# Patient Record
Sex: FEMALE | Race: WHITE | NOT HISPANIC OR LATINO
[De-identification: names, ages, dates, MRNs, and addresses within clinical notes are randomized per-mention and may not be internally consistent; named-entity substitution may affect disease eponyms.]

---

## 2018-12-02 ENCOUNTER — TRANSCRIPTION ENCOUNTER (OUTPATIENT)
Age: 48
End: 2018-12-02

## 2021-09-23 ENCOUNTER — INPATIENT (INPATIENT)
Facility: HOSPITAL | Age: 51
LOS: 4 days | Discharge: HOME | End: 2021-09-28
Attending: STUDENT IN AN ORGANIZED HEALTH CARE EDUCATION/TRAINING PROGRAM | Admitting: STUDENT IN AN ORGANIZED HEALTH CARE EDUCATION/TRAINING PROGRAM
Payer: COMMERCIAL

## 2021-09-23 VITALS
HEART RATE: 101 BPM | RESPIRATION RATE: 18 BRPM | DIASTOLIC BLOOD PRESSURE: 52 MMHG | SYSTOLIC BLOOD PRESSURE: 107 MMHG | TEMPERATURE: 98 F | OXYGEN SATURATION: 99 %

## 2021-09-23 LAB
ALBUMIN SERPL ELPH-MCNC: 3.4 G/DL — LOW (ref 3.5–5.2)
ALP SERPL-CCNC: 418 U/L — HIGH (ref 30–115)
ALT FLD-CCNC: 127 U/L — HIGH (ref 0–41)
ANION GAP SERPL CALC-SCNC: 12 MMOL/L — SIGNIFICANT CHANGE UP (ref 7–14)
ANISOCYTOSIS BLD QL: SLIGHT — SIGNIFICANT CHANGE UP
APAP SERPL-MCNC: 18 UG/ML — SIGNIFICANT CHANGE UP (ref 10–30)
APPEARANCE UR: ABNORMAL
APTT BLD: 39 SEC — SIGNIFICANT CHANGE UP (ref 27–39.2)
AST SERPL-CCNC: 139 U/L — HIGH (ref 0–41)
BACTERIA # UR AUTO: ABNORMAL
BASOPHILS # BLD AUTO: 0 K/UL — SIGNIFICANT CHANGE UP (ref 0–0.2)
BASOPHILS NFR BLD AUTO: 0 % — SIGNIFICANT CHANGE UP (ref 0–1)
BILIRUB DIRECT SERPL-MCNC: 0.6 MG/DL — HIGH (ref 0–0.2)
BILIRUB INDIRECT FLD-MCNC: 0.5 MG/DL — SIGNIFICANT CHANGE UP (ref 0.2–1.2)
BILIRUB SERPL-MCNC: 1.1 MG/DL — SIGNIFICANT CHANGE UP (ref 0.2–1.2)
BILIRUB UR-MCNC: NEGATIVE — SIGNIFICANT CHANGE UP
BLD GP AB SCN SERPL QL: SIGNIFICANT CHANGE UP
BUN SERPL-MCNC: 12 MG/DL — SIGNIFICANT CHANGE UP (ref 10–20)
CALCIUM SERPL-MCNC: 7.8 MG/DL — LOW (ref 8.5–10.1)
CHLORIDE SERPL-SCNC: 101 MMOL/L — SIGNIFICANT CHANGE UP (ref 98–110)
CO2 SERPL-SCNC: 19 MMOL/L — SIGNIFICANT CHANGE UP (ref 17–32)
COLOR SPEC: ABNORMAL
CREAT SERPL-MCNC: 0.7 MG/DL — SIGNIFICANT CHANGE UP (ref 0.7–1.5)
DIFF PNL FLD: ABNORMAL
EOSINOPHIL # BLD AUTO: 0 K/UL — SIGNIFICANT CHANGE UP (ref 0–0.7)
EOSINOPHIL NFR BLD AUTO: 0 % — SIGNIFICANT CHANGE UP (ref 0–8)
GLUCOSE SERPL-MCNC: 119 MG/DL — HIGH (ref 70–99)
GLUCOSE UR QL: NEGATIVE — SIGNIFICANT CHANGE UP
HCT VFR BLD CALC: 29 % — LOW (ref 37–47)
HGB BLD-MCNC: 9.6 G/DL — LOW (ref 12–16)
HYPOCHROMIA BLD QL: SLIGHT — SIGNIFICANT CHANGE UP
INR BLD: 1.04 RATIO — SIGNIFICANT CHANGE UP (ref 0.65–1.3)
KETONES UR-MCNC: NEGATIVE — SIGNIFICANT CHANGE UP
LACTATE SERPL-SCNC: 1 MMOL/L — SIGNIFICANT CHANGE UP (ref 0.7–2)
LEUKOCYTE ESTERASE UR-ACNC: ABNORMAL
LIDOCAIN IGE QN: 34 U/L — SIGNIFICANT CHANGE UP (ref 7–60)
LYMPHOCYTES # BLD AUTO: 6.13 K/UL — HIGH (ref 1.2–3.4)
LYMPHOCYTES # BLD AUTO: 76.3 % — HIGH (ref 20.5–51.1)
MANUAL SMEAR VERIFICATION: SIGNIFICANT CHANGE UP
MCHC RBC-ENTMCNC: 28.6 PG — SIGNIFICANT CHANGE UP (ref 27–31)
MCHC RBC-ENTMCNC: 33.1 G/DL — SIGNIFICANT CHANGE UP (ref 32–37)
MCV RBC AUTO: 86.3 FL — SIGNIFICANT CHANGE UP (ref 81–99)
MONOCYTES # BLD AUTO: 0.14 K/UL — SIGNIFICANT CHANGE UP (ref 0.1–0.6)
MONOCYTES NFR BLD AUTO: 1.7 % — SIGNIFICANT CHANGE UP (ref 1.7–9.3)
MYELOCYTES NFR BLD: 1.8 % — HIGH (ref 0–0)
NEUTROPHILS # BLD AUTO: 1.62 K/UL — SIGNIFICANT CHANGE UP (ref 1.4–6.5)
NEUTROPHILS NFR BLD AUTO: 14.9 % — LOW (ref 42.2–75.2)
NEUTS BAND # BLD: 5.3 % — SIGNIFICANT CHANGE UP (ref 0–6)
NITRITE UR-MCNC: NEGATIVE — SIGNIFICANT CHANGE UP
PH UR: 7 — SIGNIFICANT CHANGE UP (ref 5–8)
PLAT MORPH BLD: NORMAL — SIGNIFICANT CHANGE UP
PLATELET # BLD AUTO: 36 K/UL — LOW (ref 130–400)
POIKILOCYTOSIS BLD QL AUTO: SLIGHT — SIGNIFICANT CHANGE UP
POLYCHROMASIA BLD QL SMEAR: SLIGHT — SIGNIFICANT CHANGE UP
POTASSIUM SERPL-MCNC: 3.7 MMOL/L — SIGNIFICANT CHANGE UP (ref 3.5–5)
POTASSIUM SERPL-SCNC: 3.7 MMOL/L — SIGNIFICANT CHANGE UP (ref 3.5–5)
PROT SERPL-MCNC: 6.4 G/DL — SIGNIFICANT CHANGE UP (ref 6–8)
PROT UR-MCNC: ABNORMAL
PROTHROM AB SERPL-ACNC: 11.9 SEC — SIGNIFICANT CHANGE UP (ref 9.95–12.87)
RBC # BLD: 3.36 M/UL — LOW (ref 4.2–5.4)
RBC # FLD: 14.9 % — HIGH (ref 11.5–14.5)
RBC BLD AUTO: ABNORMAL
RBC CASTS # UR COMP ASSIST: >50 /HPF — HIGH (ref 0–4)
SARS-COV-2 RNA SPEC QL NAA+PROBE: SIGNIFICANT CHANGE UP
SMUDGE CELLS # BLD: PRESENT — SIGNIFICANT CHANGE UP
SODIUM SERPL-SCNC: 132 MMOL/L — LOW (ref 135–146)
SP GR SPEC: 1.01 — SIGNIFICANT CHANGE UP (ref 1.01–1.03)
UROBILINOGEN FLD QL: SIGNIFICANT CHANGE UP
WBC # BLD: 8.03 K/UL — SIGNIFICANT CHANGE UP (ref 4.8–10.8)
WBC # FLD AUTO: 8.03 K/UL — SIGNIFICANT CHANGE UP (ref 4.8–10.8)

## 2021-09-23 PROCEDURE — 76705 ECHO EXAM OF ABDOMEN: CPT | Mod: 26

## 2021-09-23 PROCEDURE — 99244 OFF/OP CNSLTJ NEW/EST MOD 40: CPT

## 2021-09-23 PROCEDURE — 99285 EMERGENCY DEPT VISIT HI MDM: CPT

## 2021-09-23 PROCEDURE — 74177 CT ABD & PELVIS W/CONTRAST: CPT | Mod: 26,MA

## 2021-09-23 PROCEDURE — 99221 1ST HOSP IP/OBS SF/LOW 40: CPT | Mod: GC

## 2021-09-23 RX ORDER — PANTOPRAZOLE SODIUM 20 MG/1
40 TABLET, DELAYED RELEASE ORAL
Refills: 0 | Status: DISCONTINUED | OUTPATIENT
Start: 2021-09-23 | End: 2021-09-28

## 2021-09-23 RX ORDER — ENOXAPARIN SODIUM 100 MG/ML
40 INJECTION SUBCUTANEOUS DAILY
Refills: 0 | Status: DISCONTINUED | OUTPATIENT
Start: 2021-09-23 | End: 2021-09-24

## 2021-09-23 RX ORDER — SODIUM CHLORIDE 9 MG/ML
1000 INJECTION INTRAMUSCULAR; INTRAVENOUS; SUBCUTANEOUS
Refills: 0 | Status: DISCONTINUED | OUTPATIENT
Start: 2021-09-23 | End: 2021-09-27

## 2021-09-23 RX ORDER — MORPHINE SULFATE 50 MG/1
4 CAPSULE, EXTENDED RELEASE ORAL ONCE
Refills: 0 | Status: DISCONTINUED | OUTPATIENT
Start: 2021-09-23 | End: 2021-09-23

## 2021-09-23 RX ORDER — CHLORHEXIDINE GLUCONATE 213 G/1000ML
1 SOLUTION TOPICAL
Refills: 0 | Status: DISCONTINUED | OUTPATIENT
Start: 2021-09-23 | End: 2021-09-28

## 2021-09-23 RX ORDER — KETOROLAC TROMETHAMINE 30 MG/ML
15 SYRINGE (ML) INJECTION ONCE
Refills: 0 | Status: DISCONTINUED | OUTPATIENT
Start: 2021-09-23 | End: 2021-09-23

## 2021-09-23 RX ORDER — PIPERACILLIN AND TAZOBACTAM 4; .5 G/20ML; G/20ML
3.38 INJECTION, POWDER, LYOPHILIZED, FOR SOLUTION INTRAVENOUS EVERY 8 HOURS
Refills: 0 | Status: DISCONTINUED | OUTPATIENT
Start: 2021-09-23 | End: 2021-09-27

## 2021-09-23 RX ORDER — MORPHINE SULFATE 50 MG/1
2 CAPSULE, EXTENDED RELEASE ORAL EVERY 8 HOURS
Refills: 0 | Status: DISCONTINUED | OUTPATIENT
Start: 2021-09-23 | End: 2021-09-24

## 2021-09-23 RX ORDER — OXYCODONE HYDROCHLORIDE 5 MG/1
5 TABLET ORAL EVERY 4 HOURS
Refills: 0 | Status: DISCONTINUED | OUTPATIENT
Start: 2021-09-23 | End: 2021-09-24

## 2021-09-23 RX ORDER — PIPERACILLIN AND TAZOBACTAM 4; .5 G/20ML; G/20ML
3.38 INJECTION, POWDER, LYOPHILIZED, FOR SOLUTION INTRAVENOUS ONCE
Refills: 0 | Status: COMPLETED | OUTPATIENT
Start: 2021-09-23 | End: 2021-09-23

## 2021-09-23 RX ORDER — ACETAMINOPHEN 500 MG
650 TABLET ORAL EVERY 6 HOURS
Refills: 0 | Status: DISCONTINUED | OUTPATIENT
Start: 2021-09-23 | End: 2021-09-24

## 2021-09-23 RX ADMIN — SODIUM CHLORIDE 75 MILLILITER(S): 9 INJECTION INTRAMUSCULAR; INTRAVENOUS; SUBCUTANEOUS at 23:00

## 2021-09-23 RX ADMIN — Medication 0.5 MILLIGRAM(S): at 22:04

## 2021-09-23 RX ADMIN — Medication 15 MILLIGRAM(S): at 22:04

## 2021-09-23 RX ADMIN — PIPERACILLIN AND TAZOBACTAM 200 GRAM(S): 4; .5 INJECTION, POWDER, LYOPHILIZED, FOR SOLUTION INTRAVENOUS at 17:17

## 2021-09-23 RX ADMIN — Medication 15 MILLIGRAM(S): at 21:37

## 2021-09-23 RX ADMIN — PIPERACILLIN AND TAZOBACTAM 3.38 GRAM(S): 4; .5 INJECTION, POWDER, LYOPHILIZED, FOR SOLUTION INTRAVENOUS at 20:14

## 2021-09-23 NOTE — H&P ADULT - HISTORY OF PRESENT ILLNESS
49 yo F with PMHx anxiety who presents to ED for abd pain x5 days.     Since Monday, pt began to feel fever with Tmax 103F associated with generalized weakness and chills. Abd pain is located _______    In the ED, VS TMax: 97.9F HR: 83 BP: 111/55 RR: 18 SpO2: 100%. Labs notable for , , . US showed diffusely thickened edematous gallbladder wall and surrounding pericholecystic fluid, suspicious for cholecystitis, as well as hepatomegaly. CT A/P c/w periportal edema and extensive gallbladder wall edema/thickening, Hepatosplenomegaly, Trace free pelvic ascites and right pleural effusion.   Pt was seen by sx who rec GI eval and admission to medicine for hepatitis vs hematological process causing hepatomagally. Seen by GI who rec hepatitis panel, CMV and EBV and HIDA.    51 yo F with PMHx anxiety who presents to ED for abd pain x 1ms.     Pt states that for the last month she has been taking 3G Tylenol per day due to H/A and abd pain, as well as drinking  Since Monday, pt began to feel fever with Tmax 103F associated with generalized weakness and chills. Abd pain is located _______2-4 glasses of wine.     In the ED, VS TMax: 97.9F HR: 83 BP: 111/55 RR: 18 SpO2: 100%. Labs notable for , , . US showed diffusely thickened edematous gallbladder wall and surrounding pericholecystic fluid, suspicious for cholecystitis, as well as hepatomegaly. CT A/P c/w periportal edema and extensive gallbladder wall edema/thickening, Hepatosplenomegaly, Trace free pelvic ascites and right pleural effusion.   Pt was seen by sx who rec GI eval and admission to medicine for hepatitis vs hematological process causing hepatomagally. Seen by GI who rec hepatitis panel, CMV and EBV and HIDA.    51 yo F with PMHx anxiety who presents to ED for abd pain x 1ms.     Pt states that for the last month she has been taking 3G Tylenol per day due to daily H/A and abd pain which she thought were due to premenopausal symptoms. Admits to drinking 2-4 glasses of wine for last month to cope with recent loss of her father. Denies drinking first thing in the morning and feeling guilty about drinking. However, since Sunday, pt began to feel sporadic fevers as high as Tmax 103F associated with generalized weakness, chills and abd pain. Pain is located in the midepigastric region, Intermittent, radiating to the LLQ, 6-10/10, a/w nausea and 2 episodes of nonbilious vomiting.     In the ED, VS T Max: 97.9F HR: 83 BP: 111/55 RR: 18 SpO2: 100%. Labs notable for , , , Platelet count of 39, Hg 9.6, Na 132. UA mildly (+).  US showed diffusely thickened edematous gallbladder wall and surrounding pericholecystic fluid, suspicious for cholecystitis, as well as hepatomegaly. CT A/P c/w periportal edema and extensive gallbladder wall edema/thickening, Hepatosplenomegaly, Trace free pelvic ascites and right pleural effusion.     Pt was seen by sx who rec GI eval and admission to medicine for hepatitis vs hematological process causing hepatosplenomegaly. Seen by GI who rec hepatitis panel, CMV and EBV and HIDA.     Pt is being admitted for acute cholecystitis and possible concomitant neoplastic disease.

## 2021-09-23 NOTE — CONSULT NOTE ADULT - SUBJECTIVE AND OBJECTIVE BOX
GENERAL SURGERY CONSULT NOTE    Patient: SIMRAN OJEDA , 50y ([birth year])Female   MRN: [id number]  Location: [facility]  Visit: 09-23-21 Emergency  Date: 09-23-21 @ 17:24    HPI: 50 years old female with no past medical or surgical history, come to ED c/o fever, chills and abdominal pain. Patient refers 2 weeks ago had sore throat and nasal congestion,  for the last 4 days she is presenting with high grade fevers (103), sweating and chills, associated with mild abdominal pain on epigastrium, 5/10, pressure like, with diarrhea and 1 episode of emesis yesterday, she went to PCP on 9/22, lab work was performed showing WBC 7.5 platelet 67, total bilirubin 1.5, , , . Pt denies CP, SOB, urinary symptoms, choluria, light stools, itching, jaundice.       PAST MEDICAL & SURGICAL HISTORY: No past medical history      Home Medications: None    VITALS:  T(F): 96.9 (09-23-21 @ 16:09), Max: 97.9 (09-23-21 @ 12:26)  HR: 83 (09-23-21 @ 16:09) (83 - 101)  BP: 111/55 (09-23-21 @ 16:09) (107/52 - 111/55)  RR: 18 (09-23-21 @ 16:09) (18 - 18)  SpO2: 100% (09-23-21 @ 16:09) (99% - 100%)    PHYSICAL EXAM:  General: NAD, AAOx3, calm and cooperative  HEENT: NCAT, FANNY, EOMI, Trachea ML, Neck supple  Cardiac: RRR   Respiratory: CTAB, normal respiratory effort, breath sounds equal BL, no wheeze, rhonchi or crackles  Abdomen: Soft, mild tender on epigastrium and RUQ, no rebound, no guarding. Hepatomegaly  Vascular: Pulses 2+ throughout, extremities well perfused  Skin: Warm/dry, normal color, no jaundice    MEDICATIONS  (STANDING):    MEDICATIONS  (PRN):      LAB/STUDIES:    09-23    132<L>  |  101  |  12  ----------------------------<  119<H>  3.7   |  19  |  0.7    Ca    7.8<L>      23 Sep 2021 14:35    TPro  6.4  /  Alb  3.4<L>  /  TBili  1.1  /  DBili  0.6<H>  /  AST  139<H>  /  ALT  127<H>  /  AlkPhos  418<H>  09-23      LIVER FUNCTIONS - ( 23 Sep 2021 14:35 )  Alb: 3.4 g/dL / Pro: 6.4 g/dL / ALK PHOS: 418 U/L / ALT: 127 U/L / AST: 139 U/L / GGT: x                         IMAGING: < from: US Abdomen Upper Quadrant Right (09.23.21 @ 15:40) >  FINDINGS:    Liver: Hepatomegaly. No focal lesion.  Bile ducts: Normal caliber. Common bile duct measures 4 mm.  Gallbladder: Thickened and mildly edematous gallbladder wall to 0.5 cm. Surrounding pericholecystic fluid. No cholelithiasis. No reported sonographic Collins's sign..  Pancreas: Visualized portions are within normal limits.  Right kidney: 11.0 cm. No hydronephrosis.  Ascites: None.    < end of copied text >        ACCESS DEVICES:  [ ] Peripheral IV  [ ] Central Venous Line	[ ] R	[ ] L	[ ] IJ	[ ] Fem	[ ] SC	Placed:   [ ] Arterial Line		[ ] R	[ ] L	[ ] Fem	[ ] Rad	[ ] Ax	Placed:   [ ] PICC:					[ ] Mediport  [ ] Urinary Catheter, Date Placed:        GENERAL SURGERY CONSULT NOTE    Patient: SIMRAN OJEDA , 50y ([birth year])Female   MRN: [id number]  Location: [facility] ED  Visit: 09-23-21 Emergency  Date: 09-23-21 @ 17:24    HPI: 50 years old female with no past medical or surgical history, come to ED c/o fever, chills and abdominal pain. Patient refers 2 weeks ago had sore throat and nasal congestion,  for the last 4 days she is presenting with high grade fevers (103), sweating and chills, associated with mild abdominal pain on epigastrium, 5/10, pressure like, with diarrhea and 1 episode of emesis yesterday, she went to PCP on 9/22, lab work was performed showing WBC 7.5 platelet 67, total bilirubin 1.5, , , . Pt denies CP, SOB, urinary symptoms, choluria, light stools, itching, jaundice.       PAST MEDICAL & SURGICAL HISTORY: No past medical history      Home Medications: None    VITALS:  T(F): 96.9 (09-23-21 @ 16:09), Max: 97.9 (09-23-21 @ 12:26)  HR: 83 (09-23-21 @ 16:09) (83 - 101)  BP: 111/55 (09-23-21 @ 16:09) (107/52 - 111/55)  RR: 18 (09-23-21 @ 16:09) (18 - 18)  SpO2: 100% (09-23-21 @ 16:09) (99% - 100%)    PHYSICAL EXAM:  General: NAD, AAOx3, calm and cooperative  HEENT: NCAT, FANNY, EOMI, Trachea ML, Neck supple  Cardiac: RRR   Respiratory: CTAB, normal respiratory effort, breath sounds equal BL, no wheeze, rhonchi or crackles  Abdomen: Soft, mild tender on epigastrium and RUQ, no rebound, no guarding. Hepatomegaly  Vascular: Pulses 2+ throughout, extremities well perfused  Skin: Warm/dry, normal color, no jaundice    MEDICATIONS  (STANDING):    MEDICATIONS  (PRN):      LAB/STUDIES:    09-23    132<L>  |  101  |  12  ----------------------------<  119<H>  3.7   |  19  |  0.7    Ca    7.8<L>      23 Sep 2021 14:35    TPro  6.4  /  Alb  3.4<L>  /  TBili  1.1  /  DBili  0.6<H>  /  AST  139<H>  /  ALT  127<H>  /  AlkPhos  418<H>  09-23      LIVER FUNCTIONS - ( 23 Sep 2021 14:35 )  Alb: 3.4 g/dL / Pro: 6.4 g/dL / ALK PHOS: 418 U/L / ALT: 127 U/L / AST: 139 U/L / GGT: x           IMAGING: < from: US Abdomen Upper Quadrant Right (09.23.21 @ 15:40) >  FINDINGS:    Liver: Hepatomegaly. No focal lesion.  Bile ducts: Normal caliber. Common bile duct measures 4 mm.  Gallbladder: Thickened and mildly edematous gallbladder wall to 0.5 cm. Surrounding pericholecystic fluid. No cholelithiasis. No reported sonographic Collins's sign..  Pancreas: Visualized portions are within normal limits.  Right kidney: 11.0 cm. No hydronephrosis.  Ascites: None.    < end of copied text >

## 2021-09-23 NOTE — H&P ADULT - ATTENDING COMMENTS
REVIEW OF SYSTEMS: see cc/HPI   CONSTITUTIONAL: No weakness,(+) fevers/ chills, (+) ?? night sweats ( taking large doses of Tylenol - may have masked symptoms), increased stress and increased EtOH and Tylenol use   EYES/ENT: No visual changes;  No vertigo or throat pain   NECK: No pain or stiffness  RESPIRATORY: No cough, wheezing, hemoptysis; No shortness of breath  CARDIOVASCULAR: No chest pain or palpitations  GASTROINTESTINAL: (+) abdominal pain - RUQ and mid-abdominal area. No nausea, vomiting, or hematemesis; (+) diarrhea for 3 days ( usually present pre-menstrual) NO constipation. No melena or hematochezia.  GENITOURINARY: No dysuria, frequency or hematuria  NEUROLOGICAL: No numbness or weakness  SKIN: No itching, rashes    Physical Exam:  General: WN/WD NAD  Neurology: A&Ox3, nonfocal, follows commands  Eyes: PERRLA/ EOMI  ENT/Neck: Neck supple, trachea midline, No JVD  Respiratory: CTA B/L, No wheezing, rales, rhonchi  CV: Normal rate regular rhythm, S1S2, no murmurs, rubs or gallops  Abdominal: Soft, NT, ND +BS,   Extremities: No edema, + peripheral pulses  Skin: No Rashes, Hematoma, Ecchymosis  Incisions: n/a  Tubes: n/a  A/p   Acute cholecystitis   -NPO / IV fluids / PPI   -IV abx   -check blood Cx  -HIDA ( surgery to follow )  -Surgical team follow up - case d/w team - Dr. Mart    Hepatosplenomegaly / thrombocytosis / fever ( at least a months)/ recent sore throat and inguinal lymph node tenderness r/o infectious process r/o neoplastic process   -Peripheral smear   -Hem/Onc eval   -check CLD labs and infectious profile   -serial CBC     DVT prophylaxis - SCD to LEs while in bed, otherwise free to anbulate REVIEW OF SYSTEMS: see cc/HPI   CONSTITUTIONAL: No weakness,(+) fevers/ chills, (+) ?? night sweats ( taking large doses of Tylenol - may have masked symptoms), increased stress and increased EtOH and Tylenol use   EYES/ENT: No visual changes;  No vertigo or throat pain   NECK: No pain or stiffness  RESPIRATORY: No cough, wheezing, hemoptysis; No shortness of breath  CARDIOVASCULAR: No chest pain or palpitations  GASTROINTESTINAL: (+) abdominal pain - RUQ and mid-abdominal area. No nausea, vomiting, or hematemesis; (+) diarrhea for 3 days ( usually present pre-menstrual) NO constipation. No melena or hematochezia.  GENITOURINARY: No dysuria, frequency or hematuria  NEUROLOGICAL: No numbness or weakness  SKIN: No itching, rashes    Physical Exam:  General: WN/WD NAD  Neurology: A&Ox3, nonfocal, follows commands  Eyes: PERRLA/ EOMI  ENT/Neck: Neck supple, trachea midline, No JVD  Respiratory: CTA B/L, No wheezing, rales, rhonchi  CV: Normal rate regular rhythm, S1S2, no murmurs, rubs or gallops  Abdominal: Soft, NT, ND +BS,   Extremities: No edema, + peripheral pulses  Skin: No Rashes, Hematoma, Ecchymosis  Incisions: n/a  Tubes: n/a  A/p   Acute cholecystitis   -NPO / IV fluids / PPI   -IV abx   -check blood Cx  -HIDA ( surgery to follow )  -Surgical team follow up - case d/w team - Dr. Mart    Hepatosplenomegaly / thrombocytosis / fever ( at least a months)/ recent sore throat and inguinal lymph node tenderness r/o infectious process r/o neoplastic process   -Peripheral smear   -Hem/Onc eval   -check CLD labs and infectious profile   -serial CBC   -PRN pain ( no APAP)     Chronic anxiety - on Ativan RTC   -c/w ativan PRN    DVT prophylaxis - SCD to LEs while in bed, otherwise free to anbulate REVIEW OF SYSTEMS: see cc/HPI   CONSTITUTIONAL: No weakness,(+) fevers/ chills, (+) ?? night sweats ( taking large doses of Tylenol - may have masked symptoms), increased stress and increased EtOH and Tylenol use   EYES/ENT: No visual changes;  No vertigo or throat pain   NECK: No pain or stiffness  RESPIRATORY: No cough, wheezing, hemoptysis; No shortness of breath  CARDIOVASCULAR: No chest pain or palpitations  GASTROINTESTINAL: (+) abdominal pain - RUQ and mid-abdominal area. No nausea, vomiting, or hematemesis; (+) diarrhea for 3 days ( usually present pre-menstrual) NO constipation. No melena or hematochezia.  GENITOURINARY: No dysuria, frequency or hematuria  NEUROLOGICAL: No numbness or weakness  SKIN: No itching, rashes    Physical Exam:  General: WN/WD NAD  Neurology: A&Ox3, nonfocal, follows commands  Eyes: PERRLA/ EOMI  ENT/Neck: Neck supple, trachea midline, No JVD  Respiratory: CTA B/L, No wheezing, rales, rhonchi  CV: Normal rate regular rhythm, S1S2, no murmurs, rubs or gallops  Abdominal: Soft, NT, ND +BS,   Extremities: No edema, + peripheral pulses  Skin: No Rashes, Hematoma, Ecchymosis  Incisions: n/a  Tubes: n/a  A/p   Acute cholecystitis   -NPO / IV fluids / PPI   -IV abx   -check blood Cx  -HIDA ( surgery to follow )  -Surgical team follow up - case d/w team - Dr. Mart    Hepatosplenomegaly / thrombocytosis / fever ( at least a months)/ recent sore throat and inguinal lymph node tenderness r/o infectious process r/o neoplastic process   -Peripheral smear   -Hem/Onc eval   -check CLD labs and infectious profile   -serial CBC   -PRN pain ( no APAP)     Chronic anxiety - on Ativan RTC   -c/w ativan PRN    DVT prophylaxis - SCD to LEs while in bed, otherwise free to ambulate

## 2021-09-23 NOTE — ED ADULT TRIAGE NOTE - CHIEF COMPLAINT QUOTE
pt c/o generalized weakness, abdominal discomfort since Sunday, blood work done yesterday elevated liver enzymes,

## 2021-09-23 NOTE — ED PROVIDER NOTE - ATTENDING CONTRIBUTION TO CARE
50F with pmh anxiety who presents to [facility] for epigastric pain since Sunday, fevers with  since Monday associated with generalized weakness and chills. Outpatient labs showed elevated LFTs and she was sent to the ER for further eval. Denies vomiting, diarrhea, flank pain, urinary sx. She reports she has been taking large doses of tylenol for pain.     Gen - NAD, Head - NCAT, Pharynx - clear, MMM, Heart - RRR, no m/g/r, Lungs - CTAB, no w/c/r, Abdomen - soft, + epigastric ttp, ND, Skin - No rash, Extremities - FROM, no edema, erythema, ecchymosis, brisk cap refill, Neuro - A&O x3, equal strength and sensation, non-focal exam    a/p:  epigastric pain  labs, ruq us, ua  analgesia, ivf  reassess

## 2021-09-23 NOTE — ED ADULT NURSE NOTE - [facility]EN[facility]_ED_ALL_ED
Dupixent Pregnancy And Lactation Text: This medication likely crosses the placenta but the risk for the fetus is uncertain. This medication is excreted in breast milk. Xolair Counseling:  Patient informed of potential adverse effects including but not limited to fever, muscle aches, rash and allergic reactions. The patient verbalized understanding of the proper use and possible adverse effects of Xolair. All of the patient's questions and concerns were addressed. Topical Clindamycin Counseling: Patient counseled that this medication may cause skin irritation or allergic reactions. In the event of skin irritation, the patient was advised to reduce the amount of the drug applied or use it less frequently. The patient verbalized understanding of the proper use and possible adverse effects of clindamycin. All of the patient's questions and concerns were addressed. Azithromycin Counseling:  I discussed with the patient the risks of azithromycin including but not limited to GI upset, allergic reaction, drug rash, diarrhea, and yeast infections. Yes - the patient is able to be screened partum. Xeljanz Counseling: Karthikeyan Fields 

## 2021-09-23 NOTE — ED PROVIDER NOTE - PHYSICAL EXAMINATION
Physical Exam    Vital Signs: I have reviewed the initial vital signs.  Constitutional: well-nourished, appears stated age, no acute distress  Eyes: Conjunctiva pink, Sclera clear  Cardiovascular: S1 and S2, regular rate, regular rhythm, well-perfused extremities, radial pulses equal and 2+ b/l.   Respiratory: unlabored respiratory effort, clear to auscultation bilaterally no wheezing, rales and rhonchi. pt is speaking full sentences. no accessory muscle use.   Gastrointestinal: soft, (+) mild upper abdominal tenderness b/l, upper abdomen mildly distended abdomen, no pulsatile mass, normal bowl sounds, no rebound, no guarding, negative psoas, negative obturator, negative murphys. (+) hepatosplenomegaly. no cva tenderness.   Musculoskeletal: supple neck, no lower extremity edema, no calf tenderness  Integumentary: warm, dry, no rash  Neurologic: awake, alert, cranial nerves II-XII grossly intact, extremities’ motor and sensory functions grossly intact.    Psychiatric: appropriate mood, appropriate affect

## 2021-09-23 NOTE — ED PROVIDER NOTE - NS ED ROS FT
CONST: (+) fever, chills, and weakness. No bodyaches  EYES: No pain, redness, drainage or visual changes.  ENT: No ear pain or discharge, nasal discharge or congestion. No sore throat  CARD: No chest pain, palpitations  RESP: No SOB, cough, hemoptysis. No hx of asthma or COPD  GI: (+) abdominal pain, and nausea. No V/D  : No urinary symptoms  MS: No joint pain, back pain or extremity pain/injury  SKIN: No rashes  NEURO: No headache, dizziness, paresthesias or LOC

## 2021-09-23 NOTE — ED PROVIDER NOTE - PROGRESS NOTE DETAILS
FF: pt seen by surgery, would like ct abd/pelvis, reports it may not be cholecystitis FF: spoke with gi discussed additional lab they were requesting which I ordered, and liver doppler ultrasound, and hida scan. discussed these plans with the mar as well.

## 2021-09-23 NOTE — ED PROVIDER NOTE - CARE PLAN
1 Principal Discharge DX:	Epigastric pain  Secondary Diagnosis:	Hepatosplenomegaly  Secondary Diagnosis:	Pleural effusion, right

## 2021-09-23 NOTE — ED PROVIDER NOTE - OBJECTIVE STATEMENT
49 y/o female with a PMH of with a PMH of anxiety on ativan and prozac presents to the ED for evaluation of elevated liver enzymes, weakness, nausea, intermittent fever with a tmax of 100.3F, and upper abdominal discomfort x 4 days. pt reports she was seen by her pcp who sent her for blood work which returned with abnormal lfts. pt reports she felt that her abdomen was looking larger the past few months. pt reports she takes tylenol and motrin daily for headaches, even if she doesn't have a headache to prevent the headache from beginning. pt report she drinks wine daily. pt denies chills, chest pain, sob, cough, back pain, v/d/c, blood in the urine, blood in the stool, easy bruising, use of blood thinners, hx of autoimmune diseases, hx of hepatitis, hx of mono, sore throat, runny nose, hx of liver cirrhosis, fhx of liver disease, or hx of cancer.

## 2021-09-23 NOTE — ED ADULT NURSE NOTE - OBJECTIVE STATEMENT
Pt c/o abdominal pain, fevers, and chills x2 weeks, tmax 103.  Pt also endorses abnormal labs from pmd on 9/22. Denies cp, sob, back pain, urinary symptoms.

## 2021-09-23 NOTE — CONSULT NOTE ADULT - ASSESSMENT
50yF w/ PMHx of  ***  who presented with ***. Physical exam findings, imaging, and labs as documented above.     50 years old female with no PMH, c/o fever, chills and abdominal pain. Patient refers 2 weeks ago had sore throat and nasal congestion,  for the last 4 days she is presenting with high grade fevers (103), sweating and chills, associated with mild abdominal pain on epigastrium, 5/10, pressure like, with diarrhea and 1 episode of emesis yesterday. On PE mild tender on epigastrium and RUQ, US showed hepatomegaly, edematous gallbladder (5mm GB wall) surrounding pericholecystic fluid, no cholelithiasis.       PLAN:  - CBC  - Please obtain CT scan Abd/pelv with IV contrast      Lines/Tubes: PIV,     Above plan discussed with Attending Surgeon Dr. Mart, patient, patient family, and Primary team  09-23-21 @ 17:24 50 years old female with no PMH, c/o fever, chills and abdominal pain. Patient refers 2 weeks ago had sore throat and nasal congestion,  for the last 4 days she is presenting with high grade fevers (103), sweating and chills, associated with mild abdominal pain on epigastrium, 5/10, pressure like, with diarrhea and 1 episode of emesis yesterday. On PE mild tender on epigastrium and RUQ, US showed hepatomegaly, edematous gallbladder (5mm GB wall) surrounding pericholecystic fluid, no cholelithiasis.     PLAN:  - No acute surgical intervention  - Patient likely has acute hepatic vs. hematologic process causing massive hepatosplenomegaly. the fluid surrounding the gallbladder is likely reactive however to rule out acute cholecystitis would recommend HIDA scan  - GI consult given elevated LFTs and hepatosplenomegaly   - Will follow       Lines/Tubes: PIV,     Above plan discussed with Attending Surgeon Dr. Mart, patient, patient family, and Primary team  09-23-21 @ 17:24    Senior Resident Note  Pt seen and examined   Above note has been reviewed and edited  Plan d/w patient and Dr Brittny Cerda

## 2021-09-23 NOTE — ED PROVIDER NOTE - CLINICAL SUMMARY MEDICAL DECISION MAKING FREE TEXT BOX
50F with pmh anxiety who presents to [facility] for epigastric pain since Sunday, fevers with  since Monday associated with generalized weakness and chills. On arrival pt afebrile, vs wnl. + epigastric ttp, Labs and imaging reviewed. , , . US showed "diffusely thickened edematous gallbladder wall and surrounding pericholecystic fluid. Findings concerning for cholecystitis. Hepatomegaly". CT showed "periportal edema and extensive gallbladder wall edema/thickening, Hepatosplenomegaly, Trace free pelvic ascites and right pleural effusion." Surgery evaluated the patient and determined not 2/2 to gallbladder etiology and recommend GI eval and admission to medicine. hepatitis panel and EBV sent. ivf, analgesia given and on reassessment pt reports improvement of pain.

## 2021-09-24 LAB
ALBUMIN SERPL ELPH-MCNC: 3.4 G/DL — LOW (ref 3.5–5.2)
ALP SERPL-CCNC: 403 U/L — HIGH (ref 30–115)
ALT FLD-CCNC: 96 U/L — HIGH (ref 0–41)
ANION GAP SERPL CALC-SCNC: 12 MMOL/L — SIGNIFICANT CHANGE UP (ref 7–14)
APTT BLD: 40.2 SEC — HIGH (ref 27–39.2)
AST SERPL-CCNC: 91 U/L — HIGH (ref 0–41)
BASOPHILS # BLD AUTO: 0.02 K/UL — SIGNIFICANT CHANGE UP (ref 0–0.2)
BASOPHILS NFR BLD AUTO: 0.3 % — SIGNIFICANT CHANGE UP (ref 0–1)
BILIRUB SERPL-MCNC: 0.9 MG/DL — SIGNIFICANT CHANGE UP (ref 0.2–1.2)
BLD GP AB SCN SERPL QL: SIGNIFICANT CHANGE UP
BUN SERPL-MCNC: 13 MG/DL — SIGNIFICANT CHANGE UP (ref 10–20)
CALCIUM SERPL-MCNC: 8 MG/DL — LOW (ref 8.5–10.1)
CHLORIDE SERPL-SCNC: 101 MMOL/L — SIGNIFICANT CHANGE UP (ref 98–110)
CO2 SERPL-SCNC: 19 MMOL/L — SIGNIFICANT CHANGE UP (ref 17–32)
COVID-19 SPIKE DOMAIN AB INTERP: NEGATIVE — SIGNIFICANT CHANGE UP
COVID-19 SPIKE DOMAIN ANTIBODY RESULT: 0.4 U/ML — SIGNIFICANT CHANGE UP
CREAT SERPL-MCNC: 0.8 MG/DL — SIGNIFICANT CHANGE UP (ref 0.7–1.5)
EBV EA AB SER IA-ACNC: 6.9 U/ML — SIGNIFICANT CHANGE UP
EBV EA AB TITR SER IF: POSITIVE
EBV EA IGG SER-ACNC: NEGATIVE — SIGNIFICANT CHANGE UP
EBV NA IGG SER IA-ACNC: >600 U/ML — HIGH
EBV PATRN SPEC IB-IMP: SIGNIFICANT CHANGE UP
EBV VCA IGG AVIDITY SER QL IA: POSITIVE
EBV VCA IGM SER IA-ACNC: 23.3 U/ML — SIGNIFICANT CHANGE UP
EBV VCA IGM SER IA-ACNC: >750 U/ML — HIGH
EBV VCA IGM TITR FLD: NEGATIVE — SIGNIFICANT CHANGE UP
EOSINOPHIL # BLD AUTO: 0 K/UL — SIGNIFICANT CHANGE UP (ref 0–0.7)
EOSINOPHIL NFR BLD AUTO: 0 % — SIGNIFICANT CHANGE UP (ref 0–8)
ETHANOL SERPL-MCNC: <10 MG/DL — SIGNIFICANT CHANGE UP
GLUCOSE SERPL-MCNC: 98 MG/DL — SIGNIFICANT CHANGE UP (ref 70–99)
HAV IGM SER-ACNC: SIGNIFICANT CHANGE UP
HAV IGM SER-ACNC: SIGNIFICANT CHANGE UP
HBV CORE IGM SER-ACNC: SIGNIFICANT CHANGE UP
HBV CORE IGM SER-ACNC: SIGNIFICANT CHANGE UP
HBV SURFACE AG SER-ACNC: SIGNIFICANT CHANGE UP
HBV SURFACE AG SER-ACNC: SIGNIFICANT CHANGE UP
HCT VFR BLD CALC: 29.3 % — LOW (ref 37–47)
HCV AB S/CO SERPL IA: 0.15 S/CO — SIGNIFICANT CHANGE UP (ref 0–0.99)
HCV AB S/CO SERPL IA: 0.17 S/CO — SIGNIFICANT CHANGE UP (ref 0–0.99)
HCV AB SERPL-IMP: SIGNIFICANT CHANGE UP
HCV AB SERPL-IMP: SIGNIFICANT CHANGE UP
HGB BLD-MCNC: 9.7 G/DL — LOW (ref 12–16)
IMM GRANULOCYTES NFR BLD AUTO: 1.2 % — HIGH (ref 0.1–0.3)
INR BLD: 1.1 RATIO — SIGNIFICANT CHANGE UP (ref 0.65–1.3)
LYMPHOCYTES # BLD AUTO: 6.24 K/UL — HIGH (ref 1.2–3.4)
LYMPHOCYTES # BLD AUTO: 81.9 % — HIGH (ref 20.5–51.1)
MAGNESIUM SERPL-MCNC: 1.9 MG/DL — SIGNIFICANT CHANGE UP (ref 1.8–2.4)
MCHC RBC-ENTMCNC: 28.6 PG — SIGNIFICANT CHANGE UP (ref 27–31)
MCHC RBC-ENTMCNC: 33.1 G/DL — SIGNIFICANT CHANGE UP (ref 32–37)
MCV RBC AUTO: 86.4 FL — SIGNIFICANT CHANGE UP (ref 81–99)
MONOCYTES # BLD AUTO: 0.35 K/UL — SIGNIFICANT CHANGE UP (ref 0.1–0.6)
MONOCYTES NFR BLD AUTO: 4.6 % — SIGNIFICANT CHANGE UP (ref 1.7–9.3)
NEUTROPHILS # BLD AUTO: 0.92 K/UL — LOW (ref 1.4–6.5)
NEUTROPHILS NFR BLD AUTO: 12 % — LOW (ref 42.2–75.2)
NRBC # BLD: 0 /100 WBCS — SIGNIFICANT CHANGE UP (ref 0–0)
PLATELET # BLD AUTO: 36 K/UL — LOW (ref 130–400)
POTASSIUM SERPL-MCNC: 3.7 MMOL/L — SIGNIFICANT CHANGE UP (ref 3.5–5)
POTASSIUM SERPL-SCNC: 3.7 MMOL/L — SIGNIFICANT CHANGE UP (ref 3.5–5)
PROT SERPL-MCNC: 6.4 G/DL — SIGNIFICANT CHANGE UP (ref 6–8)
PROTHROM AB SERPL-ACNC: 12.6 SEC — SIGNIFICANT CHANGE UP (ref 9.95–12.87)
RBC # BLD: 3.39 M/UL — LOW (ref 4.2–5.4)
RBC # FLD: 15.5 % — HIGH (ref 11.5–14.5)
SARS-COV-2 IGG+IGM SERPL QL IA: 0.4 U/ML — SIGNIFICANT CHANGE UP
SARS-COV-2 IGG+IGM SERPL QL IA: NEGATIVE — SIGNIFICANT CHANGE UP
SODIUM SERPL-SCNC: 132 MMOL/L — LOW (ref 135–146)
WBC # BLD: 7.62 K/UL — SIGNIFICANT CHANGE UP (ref 4.8–10.8)
WBC # FLD AUTO: 7.62 K/UL — SIGNIFICANT CHANGE UP (ref 4.8–10.8)

## 2021-09-24 PROCEDURE — 78226 HEPATOBILIARY SYSTEM IMAGING: CPT | Mod: 26

## 2021-09-24 PROCEDURE — 99232 SBSQ HOSP IP/OBS MODERATE 35: CPT

## 2021-09-24 PROCEDURE — 99223 1ST HOSP IP/OBS HIGH 75: CPT

## 2021-09-24 PROCEDURE — 99254 IP/OBS CNSLTJ NEW/EST MOD 60: CPT

## 2021-09-24 RX ORDER — FLUOXETINE HCL 10 MG
1 CAPSULE ORAL
Qty: 0 | Refills: 0 | DISCHARGE

## 2021-09-24 RX ORDER — FOLIC ACID 0.8 MG
1 TABLET ORAL ONCE
Refills: 0 | Status: COMPLETED | OUTPATIENT
Start: 2021-09-24 | End: 2021-09-24

## 2021-09-24 RX ORDER — THIAMINE MONONITRATE (VIT B1) 100 MG
100 TABLET ORAL DAILY
Refills: 0 | Status: DISCONTINUED | OUTPATIENT
Start: 2021-09-24 | End: 2021-09-28

## 2021-09-24 RX ORDER — FLUOXETINE HCL 10 MG
20 CAPSULE ORAL DAILY
Refills: 0 | Status: DISCONTINUED | OUTPATIENT
Start: 2021-09-24 | End: 2021-09-28

## 2021-09-24 RX ORDER — KETOROLAC TROMETHAMINE 30 MG/ML
15 SYRINGE (ML) INJECTION EVERY 8 HOURS
Refills: 0 | Status: DISCONTINUED | OUTPATIENT
Start: 2021-09-24 | End: 2021-09-26

## 2021-09-24 RX ORDER — FOLIC ACID 0.8 MG
1 TABLET ORAL DAILY
Refills: 0 | Status: DISCONTINUED | OUTPATIENT
Start: 2021-09-24 | End: 2021-09-28

## 2021-09-24 RX ORDER — THIAMINE MONONITRATE (VIT B1) 100 MG
100 TABLET ORAL ONCE
Refills: 0 | Status: COMPLETED | OUTPATIENT
Start: 2021-09-24 | End: 2021-09-24

## 2021-09-24 RX ADMIN — SODIUM CHLORIDE 75 MILLILITER(S): 9 INJECTION INTRAMUSCULAR; INTRAVENOUS; SUBCUTANEOUS at 06:37

## 2021-09-24 RX ADMIN — Medication 0.5 MILLIGRAM(S): at 22:16

## 2021-09-24 RX ADMIN — Medication 20 MILLIGRAM(S): at 11:22

## 2021-09-24 RX ADMIN — Medication 1 MILLIGRAM(S): at 11:22

## 2021-09-24 RX ADMIN — Medication 100 MILLIGRAM(S): at 11:22

## 2021-09-24 RX ADMIN — PIPERACILLIN AND TAZOBACTAM 25 GRAM(S): 4; .5 INJECTION, POWDER, LYOPHILIZED, FOR SOLUTION INTRAVENOUS at 06:36

## 2021-09-24 RX ADMIN — Medication 15 MILLIGRAM(S): at 10:01

## 2021-09-24 RX ADMIN — PIPERACILLIN AND TAZOBACTAM 25 GRAM(S): 4; .5 INJECTION, POWDER, LYOPHILIZED, FOR SOLUTION INTRAVENOUS at 15:17

## 2021-09-24 RX ADMIN — PIPERACILLIN AND TAZOBACTAM 25 GRAM(S): 4; .5 INJECTION, POWDER, LYOPHILIZED, FOR SOLUTION INTRAVENOUS at 22:16

## 2021-09-24 NOTE — PROGRESS NOTE ADULT - SUBJECTIVE AND OBJECTIVE BOX
Patient is a 50y old  Female who presents with a chief complaint of Abd pain (24 Sep 2021 13:39)      Patient seen and examined at bedside.  Patient reports abd pain is improving. Denies any chest pain or shortness of breath.    ALLERGIES:  No Known Allergies    MEDICATIONS:  chlorhexidine 4% Liquid 1 Application(s) Topical <User Schedule>  FLUoxetine 20 milliGRAM(s) Oral daily  folic acid 1 milliGRAM(s) Oral daily  ketorolac   Injectable 15 milliGRAM(s) IV Push every 8 hours PRN  LORazepam     Tablet 2 milliGRAM(s) Oral every 1 hour PRN  LORazepam     Tablet 1 milliGRAM(s) Oral daily  pantoprazole    Tablet 40 milliGRAM(s) Oral before breakfast  piperacillin/tazobactam IVPB.. 3.375 Gram(s) IV Intermittent every 8 hours  sodium chloride 0.9%. 1000 milliLiter(s) IV Continuous <Continuous>  thiamine 100 milliGRAM(s) Oral daily    Vital Signs Last 24 Hrs  T(F): 99.4 (24 Sep 2021 15:31), Max: 101.9 (24 Sep 2021 08:50)  HR: 92 (24 Sep 2021 15:31) (82 - 105)  BP: 114/64 (24 Sep 2021 15:31) (103/58 - 114/64)  RR: 18 (24 Sep 2021 15:31) (18 - 18)  SpO2: 100% (24 Sep 2021 15:31) (97% - 100%)  I&O's Summary      PHYSICAL EXAM:  General: NAD, A/O x 3  ENT: MMM  Neck: Supple, No JVD  Lungs: Clear to auscultation bilaterally  Cardio: RRR, S1/S2, No murmurs  Abdomen: Soft, +tender, Nondistended; Bowel sounds present  Extremities: No cyanosis, No edema    LABS:                        9.7    7.62  )-----------( 36       ( 24 Sep 2021 06:31 )             29.3     09-24    132  |  101  |  13  ----------------------------<  98  3.7   |  19  |  0.8    Ca    8.0      24 Sep 2021 06:31  Mg     1.9     09-24    TPro  6.4  /  Alb  3.4  /  TBili  0.9  /  DBili  x   /  AST  91  /  ALT  96  /  AlkPhos  403      eGFR if Non African American: 87 mL/min/1.73M2 (21 @ 06:31)  eGFR if African American: 101 mL/min/1.73M2 (21 @ 06:31)    PT/INR - ( 24 Sep 2021 00:50 )   PT: 12.60 sec;   INR: 1.10 ratio         PTT - ( 24 Sep 2021 00:50 )  PTT:40.2 sec  Lactate, Blood: 1.0 mmol/L ( @ 14:35)                          Urinalysis Basic - ( 23 Sep 2021 15:55 )    Color: Zuleyma / Appearance: Turbid / S.010 / pH: x  Gluc: x / Ketone: Negative  / Bili: Negative / Urobili: <2 mg/dL   Blood: x / Protein: 30 mg/dL / Nitrite: Negative   Leuk Esterase: Small / RBC: >50 /HPF / WBC x   Sq Epi: x / Non Sq Epi: x / Bacteria: Few        COVID-19 PCR: NotDetec (21 @ 16:42)      RADIOLOGY & ADDITIONAL TESTS:    Care Discussed with Consultants/Other Providers:

## 2021-09-24 NOTE — CONSULT NOTE ADULT - SUBJECTIVE AND OBJECTIVE BOX
Patient is a 50y old  Female who presents with a chief complaint of Abd pain and fever. Heme/Onc is being consulted for thrombocytopenia.      HPI:  The patient is a 50 year old female with a PMH of anxiety. Patient states she has been running a fever since Monday morning. Initially she was taking Tylenol and Motrin to treat the fever. She states she was taking 1500mg of Tylenol BID and alternating with 600mg of Motrin BID. She states the highest her fever has run is 103, and she was sweating through her clothes. In addition she states she is having midline abdominal pain, which radiates to the right upper quadrant and she admits to headaches, nausea, generalized weakness, and nonbilious vomiting. She denies chest pain and shortness of breath. Heme/Onc is being consulted for thrombocytopenia. Patient states she has had anemia before and it was resolved, she is unsure of her baseline Hgb. She denies personal or family history of anemia, low platelets, and easy bruising.       ROS:  Negative except for: Fever, headache, weakness, nausea, vomiting    PAST MEDICAL & SURGICAL HISTORY:    Anxiety  IBS          Last menstrual period: 21  Last pap smear: within last 6 months, normal results as per patient  last mammogram: within last 6 months, normal results as per patient  colonoscopy: done 20 years ago due to GI distress, diagnosed with IBS      SOCIAL HISTORY:  Denies current and past tobacco use  Denies current and past recreational drug use  Admits to increased alcohol use over the passed few weeks due to coping with the loss of her father. She states for the past 4 weeks she has had 2 drinks per night      FAMILY HISTORY:  father (passed away ) DM, CHF, vascular disease  mother: stroke s/p delivery, pacemaker, mechanical valve    MEDICATIONS  (STANDING):  chlorhexidine 4% Liquid 1 Application(s) Topical <User Schedule>  FLUoxetine 20 milliGRAM(s) Oral daily  folic acid 1 milliGRAM(s) Oral daily  LORazepam     Tablet 1 milliGRAM(s) Oral daily  pantoprazole    Tablet 40 milliGRAM(s) Oral before breakfast  piperacillin/tazobactam IVPB.. 3.375 Gram(s) IV Intermittent every 8 hours  sodium chloride 0.9%. 1000 milliLiter(s) (75 mL/Hr) IV Continuous <Continuous>  thiamine 100 milliGRAM(s) Oral daily    MEDICATIONS  (PRN):  ketorolac   Injectable 15 milliGRAM(s) IV Push every 8 hours PRN Moderate Pain (4 - 6)  LORazepam     Tablet 2 milliGRAM(s) Oral every 1 hour PRN CIWA-Ar score 8 or greater      Allergies    No Known Allergies    Intolerances        Vital Signs Last 24 Hrs  T(C): 37 (24 Sep 2021 10:30), Max: 38.8 (24 Sep 2021 08:50)  T(F): 98.6 (24 Sep 2021 10:30), Max: 101.9 (24 Sep 2021 08:50)  HR: 105 (24 Sep 2021 08:50) (82 - 105)  BP: 107/64 (24 Sep 2021 08:50) (103/58 - 114/59)  BP(mean): 76 (23 Sep 2021 16:09) (76 - 76)  RR: 18 (24 Sep 2021 08:50) (18 - 18)  SpO2: 97% (24 Sep 2021 08:50) (97% - 100%)    PHYSICAL EXAM  General: adult in no acute distress, laying down in bed  HEENT: clear oropharynx, anicteric sclera, pink conjunctiva  Neck: supple  CV: normal S1/S2 with no murmur rubs or gallops  Lungs: positive air movement b/l ant lungs, clear to auscultation, no wheezes, no rales  Abdomen: tenderness to palpation in RUQ and midline, non-distended  Ext: no clubbing cyanosis or edema  Skin: no rashes and no petechiae  Neuro: alert and oriented X 4, no focal deficits      LABS:                          9.7    7.62  )-----------( 36       ( 24 Sep 2021 06:31 )             29.3         Mean Cell Volume : 86.4 fL  Mean Cell Hemoglobin : 28.6 pg  Mean Cell Hemoglobin Concentration : 33.1 g/dL  Auto Neutrophil # : 0.92 K/uL  Auto Lymphocyte # : 6.24 K/uL  Auto Monocyte # : 0.35 K/uL  Auto Eosinophil # : 0.00 K/uL  Auto Basophil # : 0.02 K/uL  Auto Neutrophil % : 12.0 %  Auto Lymphocyte % : 81.9 %  Auto Monocyte % : 4.6 %  Auto Eosinophil % : 0.0 %  Auto Basophil % : 0.3 %      Serial CBC's   @ 06:31  Hct-29.3 / Hgb-9.7 / Plat-36 / RBC-3.39 / WBC-7.62  Serial CBC's   @ 14:35  Hct-29.0 / Hgb-9.6 / Plat-36 / RBC-3.36 / WBC-8.03          132<L>  |  101  |  13  ----------------------------<  98  3.7   |  19  |  0.8    Ca    8.0<L>      24 Sep 2021 06:31  Mg     1.9         TPro  6.4  /  Alb  3.4<L>  /  TBili  0.9  /  DBili  x   /  AST  91<H>  /  ALT  96<H>  /  AlkPhos  403<H>        PT/INR - ( 24 Sep 2021 00:50 )   PT: 12.60 sec;   INR: 1.10 ratio         PTT - ( 24 Sep 2021 00:50 )  PTT:40.2 sec                BLOOD SMEAR INTERPRETATION:       RADIOLOGY & ADDITIONAL STUDIES:        US Abdomen Upper Quadrant Right (21 @ 15:40) >    EXAM:  US ABDOMEN RT UPR QUADRANT            PROCEDURE DATE:  2021            INTERPRETATION:  CLINICAL INFORMATION: Elevated liver function tests    COMPARISON: None available.    TECHNIQUE: Sonography of the right upper quadrant.      FINDINGS:    Liver: Hepatomegaly. No focal lesion.  Bile ducts: Normal caliber. Common bile duct measures 4 mm.  Gallbladder: Thickened and mildly edematous gallbladder wall to 0.5 cm. Surrounding pericholecystic fluid. No cholelithiasis. No reported sonographic Collins's sign..  Pancreas: Visualized portions are within normal limits.  Right kidney: 11.0 cm. No hydronephrosis.  Ascites: None.      IMPRESSION:    Diffusely thickened edematous gallbladder wall and surrounding pericholecystic fluid. Findings concerning for cholecystitis.    Hepatomegaly.    --- End of Report ---            MONI NASCIMENTO MD; Resident Radiologist  This document has been electronically signed.  ELLIOT LANDAU MD; Attending Radiologist  This document has been electronically signed. Sep 23 2021  3:56PM    < end of copied text >          CT Abdomen and Pelvis w/ IV Cont (21 @ 19:04) >  EXAM:  CT ABDOMEN AND PELVIS IC            PROCEDURE DATE:  2021            INTERPRETATION:  CLINICAL STATEMENT: Fever with transaminitis.    TECHNIQUE: Contiguous axial CT images were obtained from the lower chest to the pubic symphysis following administration of 100cc Optiray 320 intravenous contrast.  Oral contrast was not administered.  Reformatted images in the coronal and sagittal planes were acquired.    COMPARISON CT: None.    OTHER STUDIES USED FOR CORRELATION: None.      FINDINGS:    LOWER CHEST: Trace right pleural effusion with right basilar atelectasis..    HEPATOBILIARY: Periportal edema. Extensive gallbladder wall edema/thickening. Hepatomegaly..    SPLEEN: Splenomegaly measuring 17.4 cm in craniocaudal dimension.    PANCREAS: Unremarkable.    ADRENAL GLANDS: Unremarkable.    KIDNEYS: Symmetric renal enhancement without hydronephrosis bilaterally. Right midpole 0.6 cm nonobstructing calculus (series 4 image 110)..    ABDOMINOPELVIC NODES: Unremarkable.    PELVIC ORGANS: Left adnexal 2.1 cm cyst..    PERITONEUM/MESENTERY/BOWEL: Trace free pelvic ascites. No bowel obstruction or pneumoperitoneum. Normal caliber appendix    BONES/SOFT TISSUES: Unremarkable.    OTHER: Dilated bilateral gonadal veins.      IMPRESSION:    Periportal edema and extensive gallbladder wall edema/thickening. Findings are nonspecific.    Hepatosplenomegaly.    Trace free pelvic ascites and right pleural effusion.    --- End of Report ---              ELLIOT LANDAU MD; Attending Radiologist  This document has been electronically signed. Sep 23 2021  7:12PM    < end of copied text >     Patient is a 50y old  Female who presents with a chief complaint of Abd pain and fever. Heme/Onc is being consulted for thrombocytopenia, hepatosplenomegaly.      HPI:  The patient is a 50 year old female with a PMH of anxiety. Patient states she has been running a fever since Monday morning. Initially she was taking Tylenol and Motrin to treat the fever. She states she was taking 1500mg of Tylenol BID and alternating with 600mg of Motrin BID. She states the highest her fever has run is 103, and she was sweating through her clothes. In addition she states she is having midline abdominal pain, which radiates to the right upper quadrant and she admits to headaches, nausea, generalized weakness, and nonbilious vomiting. She denies chest pain and shortness of breath. Heme/Onc is being consulted for thrombocytopenia. Patient states she has had anemia before and it was resolved, she is unsure of her baseline Hgb. She denies personal or family history of anemia, low platelets, and easy bruising.       ROS:  Negative except for: Fever, headache, weakness, nausea, vomiting    PAST MEDICAL & SURGICAL HISTORY:    Anxiety  IBS          Last menstrual period: 21  Last pap smear: within last 6 months, normal results as per patient  last mammogram: within last 6 months, normal results as per patient  colonoscopy: done 20 years ago due to GI distress, diagnosed with IBS      SOCIAL HISTORY:  Denies current and past tobacco use  Denies current and past recreational drug use  Admits to increased alcohol use over the passed few weeks due to coping with the loss of her father. She states for the past 4 weeks she has had 2 drinks per night      FAMILY HISTORY:  father (passed away ) DM, CHF, vascular disease  mother: stroke s/p delivery, pacemaker, mechanical valve    MEDICATIONS  (STANDING):  chlorhexidine 4% Liquid 1 Application(s) Topical <User Schedule>  FLUoxetine 20 milliGRAM(s) Oral daily  folic acid 1 milliGRAM(s) Oral daily  LORazepam     Tablet 1 milliGRAM(s) Oral daily  pantoprazole    Tablet 40 milliGRAM(s) Oral before breakfast  piperacillin/tazobactam IVPB.. 3.375 Gram(s) IV Intermittent every 8 hours  sodium chloride 0.9%. 1000 milliLiter(s) (75 mL/Hr) IV Continuous <Continuous>  thiamine 100 milliGRAM(s) Oral daily    MEDICATIONS  (PRN):  ketorolac   Injectable 15 milliGRAM(s) IV Push every 8 hours PRN Moderate Pain (4 - 6)  LORazepam     Tablet 2 milliGRAM(s) Oral every 1 hour PRN CIWA-Ar score 8 or greater      Allergies    No Known Allergies    Intolerances        Vital Signs Last 24 Hrs  T(C): 37 (24 Sep 2021 10:30), Max: 38.8 (24 Sep 2021 08:50)  T(F): 98.6 (24 Sep 2021 10:30), Max: 101.9 (24 Sep 2021 08:50)  HR: 105 (24 Sep 2021 08:50) (82 - 105)  BP: 107/64 (24 Sep 2021 08:50) (103/58 - 114/59)  BP(mean): 76 (23 Sep 2021 16:09) (76 - 76)  RR: 18 (24 Sep 2021 08:50) (18 - 18)  SpO2: 97% (24 Sep 2021 08:50) (97% - 100%)    PHYSICAL EXAM  General: adult in no acute distress, laying down in bed  HEENT: clear oropharynx, anicteric sclera, pink conjunctiva  Neck: supple  CV: normal S1/S2 with no murmur rubs or gallops  Lungs: positive air movement b/l ant lungs, clear to auscultation, no wheezes, no rales  Abdomen: tenderness to palpation in RUQ and midline, non-distended  Ext: no clubbing cyanosis or edema  Skin: no rashes and no petechiae  Neuro: alert and oriented X 4, no focal deficits      LABS:                          9.7    7.62  )-----------( 36       ( 24 Sep 2021 06:31 )             29.3         Mean Cell Volume : 86.4 fL  Mean Cell Hemoglobin : 28.6 pg  Mean Cell Hemoglobin Concentration : 33.1 g/dL  Auto Neutrophil # : 0.92 K/uL  Auto Lymphocyte # : 6.24 K/uL  Auto Monocyte # : 0.35 K/uL  Auto Eosinophil # : 0.00 K/uL  Auto Basophil # : 0.02 K/uL  Auto Neutrophil % : 12.0 %  Auto Lymphocyte % : 81.9 %  Auto Monocyte % : 4.6 %  Auto Eosinophil % : 0.0 %  Auto Basophil % : 0.3 %      Serial CBC's  09-24 @ 06:31  Hct-29.3 / Hgb-9.7 / Plat-36 / RBC-3.39 / WBC-7.62  Serial CBC's   @ 14:35  Hct-29.0 / Hgb-9.6 / Plat-36 / RBC-3.36 / WBC-8.03          132<L>  |  101  |  13  ----------------------------<  98  3.7   |  19  |  0.8    Ca    8.0<L>      24 Sep 2021 06:31  Mg     1.9         TPro  6.4  /  Alb  3.4<L>  /  TBili  0.9  /  DBili  x   /  AST  91<H>  /  ALT  96<H>  /  AlkPhos  403<H>        PT/INR - ( 24 Sep 2021 00:50 )   PT: 12.60 sec;   INR: 1.10 ratio         PTT - ( 24 Sep 2021 00:50 )  PTT:40.2 sec                BLOOD SMEAR INTERPRETATION:       RADIOLOGY & ADDITIONAL STUDIES:        US Abdomen Upper Quadrant Right (21 @ 15:40) >    EXAM:  US ABDOMEN RT UPR QUADRANT            PROCEDURE DATE:  2021            INTERPRETATION:  CLINICAL INFORMATION: Elevated liver function tests    COMPARISON: None available.    TECHNIQUE: Sonography of the right upper quadrant.      FINDINGS:    Liver: Hepatomegaly. No focal lesion.  Bile ducts: Normal caliber. Common bile duct measures 4 mm.  Gallbladder: Thickened and mildly edematous gallbladder wall to 0.5 cm. Surrounding pericholecystic fluid. No cholelithiasis. No reported sonographic Collins's sign..  Pancreas: Visualized portions are within normal limits.  Right kidney: 11.0 cm. No hydronephrosis.  Ascites: None.      IMPRESSION:    Diffusely thickened edematous gallbladder wall and surrounding pericholecystic fluid. Findings concerning for cholecystitis.    Hepatomegaly.    --- End of Report ---            MONI NASCIMENTO MD; Resident Radiologist  This document has been electronically signed.  ELLIOT LANDAU MD; Attending Radiologist  This document has been electronically signed. Sep 23 2021  3:56PM    < end of copied text >          CT Abdomen and Pelvis w/ IV Cont (21 @ 19:04) >  EXAM:  CT ABDOMEN AND PELVIS IC            PROCEDURE DATE:  2021            INTERPRETATION:  CLINICAL STATEMENT: Fever with transaminitis.    TECHNIQUE: Contiguous axial CT images were obtained from the lower chest to the pubic symphysis following administration of 100cc Optiray 320 intravenous contrast.  Oral contrast was not administered.  Reformatted images in the coronal and sagittal planes were acquired.    COMPARISON CT: None.    OTHER STUDIES USED FOR CORRELATION: None.      FINDINGS:    LOWER CHEST: Trace right pleural effusion with right basilar atelectasis..    HEPATOBILIARY: Periportal edema. Extensive gallbladder wall edema/thickening. Hepatomegaly..    SPLEEN: Splenomegaly measuring 17.4 cm in craniocaudal dimension.    PANCREAS: Unremarkable.    ADRENAL GLANDS: Unremarkable.    KIDNEYS: Symmetric renal enhancement without hydronephrosis bilaterally. Right midpole 0.6 cm nonobstructing calculus (series 4 image 110)..    ABDOMINOPELVIC NODES: Unremarkable.    PELVIC ORGANS: Left adnexal 2.1 cm cyst..    PERITONEUM/MESENTERY/BOWEL: Trace free pelvic ascites. No bowel obstruction or pneumoperitoneum. Normal caliber appendix    BONES/SOFT TISSUES: Unremarkable.    OTHER: Dilated bilateral gonadal veins.      IMPRESSION:    Periportal edema and extensive gallbladder wall edema/thickening. Findings are nonspecific.    Hepatosplenomegaly.    Trace free pelvic ascites and right pleural effusion.    --- End of Report ---              ELLIOT LANDAU MD; Attending Radiologist  This document has been electronically signed. Sep 23 2021  7:12PM    < end of copied text >     Patient is a 50y old  Female who presents with a chief complaint of Abd pain and fever. Heme/Onc is being consulted for thrombocytopenia, hepatosplenomegaly.      HPI:  The patient is a 50 year old female with a PMH of anxiety. Patient states she has been running a fever since Monday morning. Initially she was taking Tylenol and Motrin to treat the fever. She states she was taking 1500mg of Tylenol BID and alternating with 600mg of Motrin BID. She states the highest her fever has run is 103, and she was sweating through her clothes. In addition she states she is having midline abdominal pain, which radiates to the right upper quadrant and she admits to headaches, nausea, generalized weakness, and nonbilious vomiting. She denies chest pain and shortness of breath. Heme/Onc is being consulted for thrombocytopenia. Patient states she has had anemia before and it was resolved, she is unsure of her baseline Hgb. She denies personal or family history of anemia, low platelets, and easy bruising.       ROS:  Negative except for: Fever, headache, weakness, nausea, vomiting    PAST MEDICAL & SURGICAL HISTORY:    Anxiety  IBS          Last menstrual period: 21  Last pap smear: within last 6 months, normal results as per patient  last mammogram: within last 6 months, normal results as per patient  colonoscopy: done 20 years ago due to GI distress, diagnosed with IBS      SOCIAL HISTORY:  Denies current and past tobacco use  Denies current and past recreational drug use  Admits to increased alcohol use over the passed few weeks due to coping with the loss of her father. She states for the past 4 weeks she has had 2 drinks per night      FAMILY HISTORY:  father (passed away ) DM, CHF, vascular disease  mother: stroke s/p delivery, pacemaker, mechanical valve    MEDICATIONS  (STANDING):  chlorhexidine 4% Liquid 1 Application(s) Topical <User Schedule>  FLUoxetine 20 milliGRAM(s) Oral daily  folic acid 1 milliGRAM(s) Oral daily  LORazepam     Tablet 1 milliGRAM(s) Oral daily  pantoprazole    Tablet 40 milliGRAM(s) Oral before breakfast  piperacillin/tazobactam IVPB.. 3.375 Gram(s) IV Intermittent every 8 hours  sodium chloride 0.9%. 1000 milliLiter(s) (75 mL/Hr) IV Continuous <Continuous>  thiamine 100 milliGRAM(s) Oral daily    MEDICATIONS  (PRN):  ketorolac   Injectable 15 milliGRAM(s) IV Push every 8 hours PRN Moderate Pain (4 - 6)  LORazepam     Tablet 2 milliGRAM(s) Oral every 1 hour PRN CIWA-Ar score 8 or greater      Allergies    No Known Allergies    Intolerances        Vital Signs Last 24 Hrs  T(C): 37 (24 Sep 2021 10:30), Max: 38.8 (24 Sep 2021 08:50)  T(F): 98.6 (24 Sep 2021 10:30), Max: 101.9 (24 Sep 2021 08:50)  HR: 105 (24 Sep 2021 08:50) (82 - 105)  BP: 107/64 (24 Sep 2021 08:50) (103/58 - 114/59)  BP(mean): 76 (23 Sep 2021 16:09) (76 - 76)  RR: 18 (24 Sep 2021 08:50) (18 - 18)  SpO2: 97% (24 Sep 2021 08:50) (97% - 100%)    PHYSICAL EXAM  General: adult in no acute distress, laying down in bed  HEENT: clear oropharynx, anicteric sclera, pink conjunctiva  Neck: supple  CV: normal S1/S2 with no murmur rubs or gallops  Lungs: positive air movement b/l ant lungs, clear to auscultation, no wheezes, no rales  Abdomen: tenderness to palpation in RUQ and midline, non-distended  Ext: no clubbing cyanosis or edema  Skin: no rashes and no petechiae  Neuro: alert and oriented X 4, no focal deficits      LABS:                          9.7    7.62  )-----------( 36       ( 24 Sep 2021 06:31 )             29.3         Mean Cell Volume : 86.4 fL  Mean Cell Hemoglobin : 28.6 pg  Mean Cell Hemoglobin Concentration : 33.1 g/dL  Auto Neutrophil # : 0.92 K/uL  Auto Lymphocyte # : 6.24 K/uL  Auto Monocyte # : 0.35 K/uL  Auto Eosinophil # : 0.00 K/uL  Auto Basophil # : 0.02 K/uL  Auto Neutrophil % : 12.0 %  Auto Lymphocyte % : 81.9 %  Auto Monocyte % : 4.6 %  Auto Eosinophil % : 0.0 %  Auto Basophil % : 0.3 %      Serial CBC's  09-24 @ 06:31  Hct-29.3 / Hgb-9.7 / Plat-36 / RBC-3.39 / WBC-7.62  Serial CBC's   @ 14:35  Hct-29.0 / Hgb-9.6 / Plat-36 / RBC-3.36 / WBC-8.03          132<L>  |  101  |  13  ----------------------------<  98  3.7   |  19  |  0.8    Ca    8.0<L>      24 Sep 2021 06:31  Mg     1.9         TPro  6.4  /  Alb  3.4<L>  /  TBili  0.9  /  DBili  x   /  AST  91<H>  /  ALT  96<H>  /  AlkPhos  403<H>        PT/INR - ( 24 Sep 2021 00:50 )   PT: 12.60 sec;   INR: 1.10 ratio         PTT - ( 24 Sep 2021 00:50 )  PTT:40.2 sec      Acetaminophen Level, Serum (21 @ 14:35)    Acetaminophen Level, Serum: 18.0 ug/mL              BLOOD SMEAR INTERPRETATION:     -Some clumping  -No Schistocytes  RADIOLOGY & ADDITIONAL STUDIES:        US Abdomen Upper Quadrant Right (21 @ 15:40) >    EXAM:  US ABDOMEN RT UPR QUADRANT            PROCEDURE DATE:  2021            INTERPRETATION:  CLINICAL INFORMATION: Elevated liver function tests    COMPARISON: None available.    TECHNIQUE: Sonography of the right upper quadrant.      FINDINGS:    Liver: Hepatomegaly. No focal lesion.  Bile ducts: Normal caliber. Common bile duct measures 4 mm.  Gallbladder: Thickened and mildly edematous gallbladder wall to 0.5 cm. Surrounding pericholecystic fluid. No cholelithiasis. No reported sonographic Collins's sign..  Pancreas: Visualized portions are within normal limits.  Right kidney: 11.0 cm. No hydronephrosis.  Ascites: None.      IMPRESSION:    Diffusely thickened edematous gallbladder wall and surrounding pericholecystic fluid. Findings concerning for cholecystitis.    Hepatomegaly.    --- End of Report ---            MONI NASCIMENTO MD; Resident Radiologist  This document has been electronically signed.  ELLIOT LANDAU MD; Attending Radiologist  This document has been electronically signed. Sep 23 2021  3:56PM    < end of copied text >          CT Abdomen and Pelvis w/ IV Cont (21 @ 19:04) >  EXAM:  CT ABDOMEN AND PELVIS IC            PROCEDURE DATE:  2021            INTERPRETATION:  CLINICAL STATEMENT: Fever with transaminitis.    TECHNIQUE: Contiguous axial CT images were obtained from the lower chest to the pubic symphysis following administration of 100cc Optiray 320 intravenous contrast.  Oral contrast was not administered.  Reformatted images in the coronal and sagittal planes were acquired.    COMPARISON CT: None.    OTHER STUDIES USED FOR CORRELATION: None.      FINDINGS:    LOWER CHEST: Trace right pleural effusion with right basilar atelectasis..    HEPATOBILIARY: Periportal edema. Extensive gallbladder wall edema/thickening. Hepatomegaly..    SPLEEN: Splenomegaly measuring 17.4 cm in craniocaudal dimension.    PANCREAS: Unremarkable.    ADRENAL GLANDS: Unremarkable.    KIDNEYS: Symmetric renal enhancement without hydronephrosis bilaterally. Right midpole 0.6 cm nonobstructing calculus (series 4 image 110)..    ABDOMINOPELVIC NODES: Unremarkable.    PELVIC ORGANS: Left adnexal 2.1 cm cyst..    PERITONEUM/MESENTERY/BOWEL: Trace free pelvic ascites. No bowel obstruction or pneumoperitoneum. Normal caliber appendix    BONES/SOFT TISSUES: Unremarkable.    OTHER: Dilated bilateral gonadal veins.      IMPRESSION:    Periportal edema and extensive gallbladder wall edema/thickening. Findings are nonspecific.    Hepatosplenomegaly.    Trace free pelvic ascites and right pleural effusion.    --- End of Report ---              ELLIOT LANDAU MD; Attending Radiologist  This document has been electronically signed. Sep 23 2021  7:12PM    < end of copied text >     Patient is a 50y old  Female who presents with a chief complaint of Abd pain and fever. Heme/Onc is being consulted for thrombocytopenia, hepatosplenomegaly.      HPI:  The patient is a 50 year old female with a PMH of anxiety. Patient states she has been running a fever since Monday morning. Initially she was taking Tylenol and Motrin to treat the fever. She states she was taking 1500mg of Tylenol BID and alternating with 600mg of Motrin BID. She states the highest her fever has run is 103, and she was sweating through her clothes. In addition she states she is having midline abdominal pain, which radiates to the right upper quadrant and she admits to headaches, nausea, generalized weakness, and nonbilious vomiting. She denies chest pain and shortness of breath. Heme/Onc is being consulted for thrombocytopenia. Patient states she has had anemia before and it was resolved, she is unsure of her baseline Hgb. She denies personal or family history of anemia, low platelets, and easy bruising.       ROS:  Negative except for: Fever, headache, weakness, nausea, vomiting    PAST MEDICAL & SURGICAL HISTORY:    Anxiety  IBS        Last menstrual period: 21  Last pap smear: within last 6 months, normal results as per patient  last mammogram: within last 6 months, normal results as per patient  colonoscopy: done 20 years ago due to GI distress, diagnosed with IBS      SOCIAL HISTORY:  Denies current and past tobacco use  Denies current and past recreational drug use  Admits to increased alcohol use over the passed few weeks due to coping with the loss of her father. She states for the past 4 weeks she has had 2 drinks per night      FAMILY HISTORY:  father (passed away ) DM, CHF, vascular disease  mother: stroke s/p delivery, pacemaker, mechanical valve    MEDICATIONS  (STANDING):  chlorhexidine 4% Liquid 1 Application(s) Topical <User Schedule>  FLUoxetine 20 milliGRAM(s) Oral daily  folic acid 1 milliGRAM(s) Oral daily  LORazepam     Tablet 1 milliGRAM(s) Oral daily  pantoprazole    Tablet 40 milliGRAM(s) Oral before breakfast  piperacillin/tazobactam IVPB.. 3.375 Gram(s) IV Intermittent every 8 hours  sodium chloride 0.9%. 1000 milliLiter(s) (75 mL/Hr) IV Continuous <Continuous>  thiamine 100 milliGRAM(s) Oral daily    MEDICATIONS  (PRN):  ketorolac   Injectable 15 milliGRAM(s) IV Push every 8 hours PRN Moderate Pain (4 - 6)  LORazepam     Tablet 2 milliGRAM(s) Oral every 1 hour PRN CIWA-Ar score 8 or greater      Allergies    No Known Allergies    Intolerances        Vital Signs Last 24 Hrs  T(C): 37 (24 Sep 2021 10:30), Max: 38.8 (24 Sep 2021 08:50)  T(F): 98.6 (24 Sep 2021 10:30), Max: 101.9 (24 Sep 2021 08:50)  HR: 105 (24 Sep 2021 08:50) (82 - 105)  BP: 107/64 (24 Sep 2021 08:50) (103/58 - 114/59)  BP(mean): 76 (23 Sep 2021 16:09) (76 - 76)  RR: 18 (24 Sep 2021 08:50) (18 - 18)  SpO2: 97% (24 Sep 2021 08:50) (97% - 100%)    PHYSICAL EXAM  General: adult in no acute distress, laying down in bed  HEENT: clear oropharynx, anicteric sclera, pink conjunctiva  Neck: supple  CV: normal S1/S2 with no murmur rubs or gallops  Lungs: positive air movement b/l ant lungs, clear to auscultation, no wheezes, no rales  Abdomen: tenderness to palpation in RUQ and midline, non-distended  Ext: no clubbing cyanosis or edema  Skin: no rashes and no petechiae  Neuro: alert and oriented X 4, no focal deficits      LABS:                          9.7    7.62  )-----------( 36       ( 24 Sep 2021 06:31 )             29.3         Mean Cell Volume : 86.4 fL  Mean Cell Hemoglobin : 28.6 pg  Mean Cell Hemoglobin Concentration : 33.1 g/dL  Auto Neutrophil # : 0.92 K/uL  Auto Lymphocyte # : 6.24 K/uL  Auto Monocyte # : 0.35 K/uL  Auto Eosinophil # : 0.00 K/uL  Auto Basophil # : 0.02 K/uL  Auto Neutrophil % : 12.0 %  Auto Lymphocyte % : 81.9 %  Auto Monocyte % : 4.6 %  Auto Eosinophil % : 0.0 %  Auto Basophil % : 0.3 %      Serial CBC's  09-24 @ 06:31  Hct-29.3 / Hgb-9.7 / Plat-36 / RBC-3.39 / WBC-7.62  Serial CBC's   @ 14:35  Hct-29.0 / Hgb-9.6 / Plat-36 / RBC-3.36 / WBC-8.03          132<L>  |  101  |  13  ----------------------------<  98  3.7   |  19  |  0.8    Ca    8.0<L>      24 Sep 2021 06:31  Mg     1.9         TPro  6.4  /  Alb  3.4<L>  /  TBili  0.9  /  DBili  x   /  AST  91<H>  /  ALT  96<H>  /  AlkPhos  403<H>        PT/INR - ( 24 Sep 2021 00:50 )   PT: 12.60 sec;   INR: 1.10 ratio         PTT - ( 24 Sep 2021 00:50 )  PTT:40.2 sec      Acetaminophen Level, Serum (21 @ 14:35)    Acetaminophen Level, Serum: 18.0 ug/mL      BLOOD SMEAR INTERPRETATION:     -Low platelet count, no clumps  -rare Schistocytes  -lymphocytosis  -Immature lymphocyte+; no blasts    RADIOLOGY & ADDITIONAL STUDIES:  US Abdomen Upper Quadrant Right (21 @ 15:40) >    EXAM:  US ABDOMEN RT UPR QUADRANT            PROCEDURE DATE:  2021      INTERPRETATION:  CLINICAL INFORMATION: Elevated liver function tests    COMPARISON: None available.    TECHNIQUE: Sonography of the right upper quadrant.      FINDINGS:    Liver: Hepatomegaly. No focal lesion.  Bile ducts: Normal caliber. Common bile duct measures 4 mm.  Gallbladder: Thickened and mildly edematous gallbladder wall to 0.5 cm. Surrounding pericholecystic fluid. No cholelithiasis. No reported sonographic Collins's sign..  Pancreas: Visualized portions are within normal limits.  Right kidney: 11.0 cm. No hydronephrosis.  Ascites: None.      IMPRESSION:    Diffusely thickened edematous gallbladder wall and surrounding pericholecystic fluid. Findings concerning for cholecystitis.    Hepatomegaly.    --- End of Report ---            MONI NASCIMENTO MD; Resident Radiologist  This document has been electronically signed.  ELLIOT LANDAU MD; Attending Radiologist  This document has been electronically signed. Sep 23 2021  3:56PM    < end of copied text >          CT Abdomen and Pelvis w/ IV Cont (21 @ 19:04) >  EXAM:  CT ABDOMEN AND PELVIS IC            PROCEDURE DATE:  2021            INTERPRETATION:  CLINICAL STATEMENT: Fever with transaminitis.    TECHNIQUE: Contiguous axial CT images were obtained from the lower chest to the pubic symphysis following administration of 100cc Optiray 320 intravenous contrast.  Oral contrast was not administered.  Reformatted images in the coronal and sagittal planes were acquired.    COMPARISON CT: None.    OTHER STUDIES USED FOR CORRELATION: None.      FINDINGS:    LOWER CHEST: Trace right pleural effusion with right basilar atelectasis..    HEPATOBILIARY: Periportal edema. Extensive gallbladder wall edema/thickening. Hepatomegaly..    SPLEEN: Splenomegaly measuring 17.4 cm in craniocaudal dimension.    PANCREAS: Unremarkable.    ADRENAL GLANDS: Unremarkable.    KIDNEYS: Symmetric renal enhancement without hydronephrosis bilaterally. Right midpole 0.6 cm nonobstructing calculus (series 4 image 110)..    ABDOMINOPELVIC NODES: Unremarkable.    PELVIC ORGANS: Left adnexal 2.1 cm cyst..    PERITONEUM/MESENTERY/BOWEL: Trace free pelvic ascites. No bowel obstruction or pneumoperitoneum. Normal caliber appendix    BONES/SOFT TISSUES: Unremarkable.    OTHER: Dilated bilateral gonadal veins.      IMPRESSION:    Periportal edema and extensive gallbladder wall edema/thickening. Findings are nonspecific.    Hepatosplenomegaly.    Trace free pelvic ascites and right pleural effusion.    --- End of Report ---        ELLIOT LANDAU MD; Attending Radiologist  This document has been electronically signed. Sep 23 2021  7:12PM    < end of copied text >

## 2021-09-24 NOTE — PROGRESS NOTE ADULT - SUBJECTIVE AND OBJECTIVE BOX
GENERAL SURGERY PROGRESS NOTE    Patient: SIMRAN OJEDA , 50y (74)Female   MRN: [id number]  Location: [facility] ER Hold 048 A  Visit: 21 Inpatient  Date: 21 @ 10:42    Hospital Day #:2      Procedure/Dx/Injuries: Fever, Abdominal pain, edematous gallbladder (5mm GB wall)    Events of past 24 hours: admitted, fever    PAST MEDICAL & SURGICAL HISTORY:      Vitals:   T(F): 101.9 (21 @ 08:50), Max: 101.9 (21 @ 08:50)  HR: 105 (21 @ 08:50)  BP: 107/64 (21 @ 08:50)  RR: 18 (21 @ 08:50)  SpO2: 97% (21 @ 08:50)      Diet, NPO after Midnight:      NPO Start Date: 23-Sep-2021,   NPO Start Time: 23:59  Except Medications      Fluids: sodium chloride 0.9%.: Solution, 1000 milliLiter(s) infuse at 75 mL/Hr      I & O's:    Bowel Movement: : [] YES [] NO  Flatus: : [] YES [] NO    PHYSICAL EXAM:  General: NAD, AAOx3, calm and cooperative  Cardiac: RRR S1, S2  Respiratory: CTAB, normal respiratory effort  Abdomen: Soft, non-distended, mild tender epigastrium, no rebound, no guarding. +BS.  Musculoskeletal: Strength 5/5 BL UE/LE, ROM intact, compartments soft  Skin: Warm/dry, normal color, no jaundice    MEDICATIONS  (STANDING):  chlorhexidine 4% Liquid 1 Application(s) Topical <User Schedule>  FLUoxetine 20 milliGRAM(s) Oral daily  folic acid 1 milliGRAM(s) Oral daily  folic acid Injectable 1 milliGRAM(s) IV Push once  LORazepam     Tablet 1 milliGRAM(s) Oral daily  pantoprazole    Tablet 40 milliGRAM(s) Oral before breakfast  piperacillin/tazobactam IVPB.. 3.375 Gram(s) IV Intermittent every 8 hours  sodium chloride 0.9%. 1000 milliLiter(s) (75 mL/Hr) IV Continuous <Continuous>  thiamine 100 milliGRAM(s) Oral daily  thiamine Injectable 100 milliGRAM(s) IV Push once    MEDICATIONS  (PRN):  ketorolac   Injectable 15 milliGRAM(s) IV Push every 8 hours PRN Moderate Pain (4 - 6)  LORazepam     Tablet 2 milliGRAM(s) Oral every 1 hour PRN CIWA-Ar score 8 or greater      DVT PROPHYLAXIS:   GI PROPHYLAXIS: pantoprazole    Tablet 40 milliGRAM(s) Oral before breakfast    ANTICOAGULATION:   ANTIBIOTICS:  piperacillin/tazobactam IVPB.. 3.375 Gram(s)            LAB/STUDIES:  Labs:  CAPILLARY BLOOD GLUCOSE                              9.7    7.62  )-----------( 36       ( 24 Sep 2021 06:31 )             29.3       Auto Neutrophil %: 12.0 % (21 @ 06:31)  Auto Immature Granulocyte %: 1.2 % (21 @ 06:31)  Auto Neutrophil %: 14.9 % (21 @ 14:35)  Band Neutrophils %: 5.3 % (21 @ 14:35)        132<L>  |  101  |  13  ----------------------------<  98  3.7   |  19  |  0.8      Calcium, Total Serum: 8.0 mg/dL (21 @ 06:31)      LFTs:             6.4  | 0.9  | 91       ------------------[403     ( 24 Sep 2021 06:31 )  3.4  | x    | 96          Lipase:x      Amylase:x         Lactate, Blood: 1.0 mmol/L (21 @ 14:35)      Coags:     12.60  ----< 1.10    ( 24 Sep 2021 00:50 )     40.2              Alcohol, Blood: <10 mg/dL (21 @ 06:31)    Urinalysis Basic - ( 23 Sep 2021 15:55 )    Color: Zuleyma / Appearance: Turbid / S.010 / pH: x  Gluc: x / Ketone: Negative  / Bili: Negative / Urobili: <2 mg/dL   Blood: x / Protein: 30 mg/dL / Nitrite: Negative   Leuk Esterase: Small / RBC: >50 /HPF / WBC x   Sq Epi: x / Non Sq Epi: x / Bacteria: Few            Alcohol, Blood: <10 mg/dL (21 @ 06:31)      IMAGING:      ACCESS/ DEVICES:  [ ] Peripheral IV  [ ] Central Venous Line	[ ] R	[ ] L	[ ] IJ	[ ] Fem	[ ] SC	Placed:   [ ] Arterial Line		[ ] R	[ ] L	[ ] Fem	[ ] Rad	[ ] Ax	Placed:   [ ] PICC:					[ ] Mediport  [ ] Urinary Catheter,  Date Placed:   [ ] Chest tube: [ ] Right, [ ] Left  [ ] ESTEVAN/Nilson Drains

## 2021-09-24 NOTE — PROGRESS NOTE ADULT - SUBJECTIVE AND OBJECTIVE BOX
SUBJECTIVE:  Patient is a 50y old Female who presents with a chief complaint of Abd pain (24 Sep 2021 10:41)   Currently admitted to medicine with the primary diagnosis of Epigastric pain     Today is hospital day 1d. There are no new issues or overnight events.     HPI  HPI:  49 yo F with PMHx anxiety who presents to ED for abd pain x 1ms.     Pt states that for the last month she has been taking 3G Tylenol per day due to daily H/A and abd pain which she thought were due to premenopausal symptoms. Admits to drinking 2-4 glasses of wine for last month to cope with recent loss of her father. Denies drinking first thing in the morning and feeling guilty about drinking. However, since , pt began to feel sporadic fevers as high as Tmax 103F associated with generalized weakness, chills and abd pain. Pain is located in the midepigastric region, Intermittent, radiating to the LLQ, 6-10/10, a/w nausea and 2 episodes of nonbilious vomiting.     In the ED, VS T Max: 97.9F HR: 83 BP: 111/55 RR: 18 SpO2: 100%. Labs notable for , , , Platelet count of 39, Hg 9.6, Na 132. UA mildly (+).  US showed diffusely thickened edematous gallbladder wall and surrounding pericholecystic fluid, suspicious for cholecystitis, as well as hepatomegaly. CT A/P c/w periportal edema and extensive gallbladder wall edema/thickening, Hepatosplenomegaly, Trace free pelvic ascites and right pleural effusion.     Pt was seen by sx who rec GI eval and admission to medicine for hepatitis vs hematological process causing hepatosplenomegaly. Seen by GI who rec hepatitis panel, CMV and EBV and HIDA.     Pt is being admitted for acute cholecystitis and possible concomitant neoplastic disease.     (23 Sep 2021 22:10)      PAST MEDICAL & SURGICAL HISTORY    ALLERGIES:  No Known Allergies    MEDICATIONS:  HOME MEDICATIONS  Ativan: 1.5 milligram(s) orally once a day  PROzac 20 mg oral capsule: 1 cap(s) orally once a day    STANDING MEDICATIONS  chlorhexidine 4% Liquid 1 Application(s) Topical <User Schedule>  FLUoxetine 20 milliGRAM(s) Oral daily  folic acid 1 milliGRAM(s) Oral daily  folic acid Injectable 1 milliGRAM(s) IV Push once  LORazepam     Tablet 1 milliGRAM(s) Oral daily  pantoprazole    Tablet 40 milliGRAM(s) Oral before breakfast  piperacillin/tazobactam IVPB.. 3.375 Gram(s) IV Intermittent every 8 hours  sodium chloride 0.9%. 1000 milliLiter(s) IV Continuous <Continuous>  thiamine 100 milliGRAM(s) Oral daily  thiamine Injectable 100 milliGRAM(s) IV Push once    PRN MEDICATIONS  ketorolac   Injectable 15 milliGRAM(s) IV Push every 8 hours PRN  LORazepam     Tablet 2 milliGRAM(s) Oral every 1 hour PRN    VITALS:   ICU Vital Signs Last 24 Hrs  T(C): 37 (24 Sep 2021 10:30), Max: 38.8 (24 Sep 2021 08:50)  T(F): 98.6 (24 Sep 2021 10:30), Max: 101.9 (24 Sep 2021 08:50)  HR: 105 (24 Sep 2021 08:50) (82 - 105)  BP: 107/64 (24 Sep 2021 08:50) (103/58 - 114/59)  BP(mean): 76 (23 Sep 2021 16:09) (76 - 76)  ABP: --  ABP(mean): --  RR: 18 (24 Sep 2021 08:50) (18 - 18)  SpO2: 97% (24 Sep 2021 08:50) (97% - 100%)    VENT SETTINGS:    LABS:                        9.7    7.62  )-----------( 36       ( 24 Sep 2021 06:31 )             29.3     09-24    132<L>  |  101  |  13  ----------------------------<  98  3.7   |  19  |  0.8    Ca    8.0<L>      24 Sep 2021 06:31  Mg     1.9     -24    TPro  6.4  /  Alb  3.4<L>  /  TBili  0.9  /  DBili  x   /  AST  91<H>  /  ALT  96<H>  /  AlkPhos  403<H>  09-24    PT/INR - ( 24 Sep 2021 00:50 )   PT: 12.60 sec;   INR: 1.10 ratio         PTT - ( 24 Sep 2021 00:50 )  PTT:40.2 sec  Urinalysis Basic - ( 23 Sep 2021 15:55 )    Color: Zuleyma / Appearance: Turbid / S.010 / pH: x  Gluc: x / Ketone: Negative  / Bili: Negative / Urobili: <2 mg/dL   Blood: x / Protein: 30 mg/dL / Nitrite: Negative   Leuk Esterase: Small / RBC: >50 /HPF / WBC x   Sq Epi: x / Non Sq Epi: x / Bacteria: Few      I&O's Detail      Lactate, Blood: 1.0 mmol/L (21 @ 14:35)            RADIOLOGY:  CARDIOLOGY

## 2021-09-24 NOTE — CONSULT NOTE ADULT - SUBJECTIVE AND OBJECTIVE BOX
Patient is a 50y old  Female who presents with a chief complaint of Abd pain (24 Sep 2021 11:15)      HPI:    Ms. Ceci Baum is a 51 yo woman with PMHx anxiety who presented to the ED for 1 month history of abdominal pain. Patient reports that she was in her usual state of health until 1 month ago, where she began having abdominal pain, sore throat and nasal congestion. She reports receiving 2 courses of outpatient ABX for URI sxs w/o relief. She states that the abdominal pain worsened 5 days ago, with pain in midepigastric area radiating to RUQ and RLQ. She also reports having daily fevers (Tmax: 103F x 5 days ago; 101.9F today), chills, nausea, non-bloody/non-bilious vomiting, and night sweats. No reported weight loss or decrease in appetite. She also endorses taking 3G Tylenol alternating with Motrin daily for headache and abdominal pain. She states she drinks "a couple" glasses of wine a night.     In the ED, VS T Max: 97.9F HR: 83 BP: 111/55 RR: 18 SpO2: 100%. Labs notable for , , , Platelet count of 36, Hg 9.6, Na 132. T Bili-1.1; Direct Bili-0.6. UA mildly (+).  US showed diffusely thickened edematous gallbladder wall and surrounding pericholecystic fluid, suspicious for cholecystitis, as well as hepatomegaly. CT A/P c/w periportal edema and extensive gallbladder wall edema/thickening, Hepatosplenomegaly, Trace free pelvic ascites and right pleural effusion. HIDA scan results pending. Plt Count Today- 36K. Last Colonoscopy: >20 years ago. She denies chest pain, dysuria, hematuria, recent weight loss or decreased appetite.       PAST MEDICAL & SURGICAL HISTORY:      MEDICATIONS  (STANDING):  chlorhexidine 4% Liquid 1 Application(s) Topical <User Schedule>  FLUoxetine 20 milliGRAM(s) Oral daily  folic acid 1 milliGRAM(s) Oral daily  LORazepam     Tablet 1 milliGRAM(s) Oral daily  pantoprazole    Tablet 40 milliGRAM(s) Oral before breakfast  piperacillin/tazobactam IVPB.. 3.375 Gram(s) IV Intermittent every 8 hours  sodium chloride 0.9%. 1000 milliLiter(s) (75 mL/Hr) IV Continuous <Continuous>  thiamine 100 milliGRAM(s) Oral daily    MEDICATIONS  (PRN):  ketorolac   Injectable 15 milliGRAM(s) IV Push every 8 hours PRN Moderate Pain (4 - 6)  LORazepam     Tablet 2 milliGRAM(s) Oral every 1 hour PRN CIWA-Ar score 8 or greater      Allergies    No Known Allergies    Intolerances        REVIEW OF SYSTEMS:    CONSTITUTIONAL:+ Fevers, +chills, +Weakness  RESPIRATORY: No cough, wheezing, hemoptysis; No shortness of breath  CARDIOVASCULAR: No chest pain or palpitations  GASTROINTESTINAL: +Abdominal Pain. No nausea, vomiting, or hematemesis; No diarrhea or constipation. No melena or hematochezia.  All other review of systems is negative unless indicated above.    Vital Signs Last 24 Hrs  T(C): 37 (24 Sep 2021 10:30), Max: 38.8 (24 Sep 2021 08:50)  T(F): 98.6 (24 Sep 2021 10:30), Max: 101.9 (24 Sep 2021 08:50)  HR: 105 (24 Sep 2021 08:50) (82 - 105)  BP: 107/64 (24 Sep 2021 08:50) (103/58 - 114/59)  BP(mean): 76 (23 Sep 2021 16:09) (76 - 76)  RR: 18 (24 Sep 2021 08:50) (18 - 18)  SpO2: 97% (24 Sep 2021 08:50) (97% - 100%)    PHYSICAL EXAM:    Constitutional: NAD, Alert and Interactive  Eyes: Anicteric sclera  Respiratory: CTAB  Cardiovascular: S1 and S2, RRR, no M/G/R  Gastrointestinal: Tenderness to deep palpation to RUQ and RLQ; +Hepatomegaly; No dullness to percussion; normoactive BS; +collins's sign elicited  Extremities: No peripheral edema or bilateral lower extremity edema  Psychiatric: Normal mood, normal affect  Skin: No rashes or bruises; no evidence of jaundice    LABS:                        9.7    7.62  )-----------( 36       ( 24 Sep 2021 06:31 )             29.3     09-24    132<L>  |  101  |  13  ----------------------------<  98  3.7   |  19  |  0.8    Ca    8.0<L>      24 Sep 2021 06:31  Mg     1.9     09-24    TPro  6.4  /  Alb  3.4<L>  /  TBili  0.9  /  DBili  x   /  AST  91<H>  /  ALT  96<H>  /  AlkPhos  403<H>  09-24    PT/INR - ( 24 Sep 2021 00:50 )   PT: 12.60 sec;   INR: 1.10 ratio         PTT - ( 24 Sep 2021 00:50 )  PTT:40.2 sec  LIVER FUNCTIONS - ( 24 Sep 2021 06:31 )  Alb: 3.4 g/dL / Pro: 6.4 g/dL / ALK PHOS: 403 U/L / ALT: 96 U/L / AST: 91 U/L / GGT: x             RADIOLOGY & ADDITIONAL STUDIES:    < from: CT Abdomen and Pelvis w/ IV Cont (09.23.21 @ 19:04) >  EXAM:  CT ABDOMEN AND PELVIS IC            PROCEDURE DATE:  09/23/2021            INTERPRETATION:  CLINICAL STATEMENT: Fever with transaminitis.    TECHNIQUE: Contiguous axial CT images were obtained from the lower chest to the pubic symphysis following administration of 100cc Optiray 320 intravenous contrast.  Oral contrast was not administered.  Reformatted images in the coronal and sagittal planes were acquired.    COMPARISON CT: None.    OTHER STUDIES USED FOR CORRELATION: None.      FINDINGS:    LOWER CHEST: Trace right pleural effusion with right basilar atelectasis..    HEPATOBILIARY: Periportal edema. Extensive gallbladder wall edema/thickening. Hepatomegaly..    SPLEEN: Splenomegaly measuring 17.4 cm in craniocaudal dimension.    PANCREAS: Unremarkable.    ADRENAL GLANDS: Unremarkable.    KIDNEYS: Symmetric renal enhancement without hydronephrosis bilaterally. Right midpole 0.6 cm nonobstructing calculus (series 4 image 110)..    ABDOMINOPELVIC NODES: Unremarkable.    PELVIC ORGANS: Left adnexal 2.1 cm cyst..    PERITONEUM/MESENTERY/BOWEL: Trace free pelvic ascites. No bowel obstruction or pneumoperitoneum. Normal caliber appendix    BONES/SOFT TISSUES: Unremarkable.    OTHER: Dilated bilateral gonadal veins.      IMPRESSION:    Periportal edema and extensive gallbladder wall edema/thickening. Findings are nonspecific.    Hepatosplenomegaly.    Trace free pelvic ascites and right pleural effusion.    --- End of Report ---    < end of copied text >  < from: US Abdomen Upper Quadrant Right (09.23.21 @ 15:40) >  EXAM:  US ABDOMEN RT UPR QUADRANT            PROCEDURE DATE:  09/23/2021            INTERPRETATION:  CLINICAL INFORMATION: Elevated liver function tests    COMPARISON: None available.    TECHNIQUE: Sonography of the right upper quadrant.      FINDINGS:    Liver: Hepatomegaly. No focal lesion.  Bile ducts: Normal caliber. Common bile duct measures 4 mm.  Gallbladder: Thickened and mildly edematous gallbladder wall to 0.5 cm. Surrounding pericholecystic fluid. No cholelithiasis. No reported sonographic Collins's sign..  Pancreas: Visualized portions are within normal limits.  Right kidney: 11.0 cm. No hydronephrosis.  Ascites: None.      IMPRESSION:    Diffusely thickened edematous gallbladder wall and surrounding pericholecystic fluid. Findings concerning for cholecystitis.    Hepatomegaly.    --- End of Report ---        < end of copied text >   Patient is a 50y old  Female who presents with a chief complaint of Abd pain (24 Sep 2021 11:15)      HPI:    Ms. Ceci Baum is a 49 yo woman with PMHx anxiety who presented to the ED for 1 month history of abdominal pain. Patient reports that she was in her usual state of health until 1 month ago, where she began having abdominal pain, sore throat and nasal congestion. She reports receiving 2 courses of outpatient ABX for URI sxs w/o relief. She states that the abdominal pain worsened 5 days ago, with pain in midepigastric area radiating to RUQ and RLQ. She also reports having daily fevers (Tmax: 103F x 5 days ago; 101.9F today), chills, nausea, non-bloody/non-bilious vomiting, and night sweats. No reported weight loss or decrease in appetite. She also endorses taking 3G Tylenol alternating with Motrin daily for headache and abdominal pain. She states she drinks "a couple" glasses of wine a night.     In the ED, VS T Max: 97.9F HR: 83 BP: 111/55 RR: 18 SpO2: 100%. Labs notable for , , , Platelet count of 36, Hg 9.6, Na 132. T Bili-1.1; Direct Bili-0.6. UA mildly (+).  US showed diffusely thickened edematous gallbladder wall and surrounding pericholecystic fluid, suspicious for cholecystitis, as well as hepatomegaly. CT A/P c/w periportal edema and extensive gallbladder wall edema/thickening, Hepatosplenomegaly, Trace free pelvic ascites and right pleural effusion. HIDA scan results pending. Plt Count Today- 36K. Spoke with the office of patient's PCP Dr. Ruthie Huggins ([phone]) regarding Plt Ct Trend, and was found 189K on 03/2021 and 67K on 09/22/2021.     Last Colonoscopy: >20 years ago. She denies chest pain, dysuria, hematuria, recent weight loss or decreased appetite.       PAST MEDICAL & SURGICAL HISTORY:      MEDICATIONS  (STANDING):  chlorhexidine 4% Liquid 1 Application(s) Topical <User Schedule>  FLUoxetine 20 milliGRAM(s) Oral daily  folic acid 1 milliGRAM(s) Oral daily  LORazepam     Tablet 1 milliGRAM(s) Oral daily  pantoprazole    Tablet 40 milliGRAM(s) Oral before breakfast  piperacillin/tazobactam IVPB.. 3.375 Gram(s) IV Intermittent every 8 hours  sodium chloride 0.9%. 1000 milliLiter(s) (75 mL/Hr) IV Continuous <Continuous>  thiamine 100 milliGRAM(s) Oral daily    MEDICATIONS  (PRN):  ketorolac   Injectable 15 milliGRAM(s) IV Push every 8 hours PRN Moderate Pain (4 - 6)  LORazepam     Tablet 2 milliGRAM(s) Oral every 1 hour PRN CIWA-Ar score 8 or greater      Allergies    No Known Allergies    Intolerances        REVIEW OF SYSTEMS:    CONSTITUTIONAL:+ Fevers, +chills, +Weakness  RESPIRATORY: No cough, wheezing, hemoptysis; No shortness of breath  CARDIOVASCULAR: No chest pain or palpitations  GASTROINTESTINAL: +Abdominal Pain. No nausea, vomiting, or hematemesis; No diarrhea or constipation. No melena or hematochezia.  All other review of systems is negative unless indicated above.    Vital Signs Last 24 Hrs  T(C): 37 (24 Sep 2021 10:30), Max: 38.8 (24 Sep 2021 08:50)  T(F): 98.6 (24 Sep 2021 10:30), Max: 101.9 (24 Sep 2021 08:50)  HR: 105 (24 Sep 2021 08:50) (82 - 105)  BP: 107/64 (24 Sep 2021 08:50) (103/58 - 114/59)  BP(mean): 76 (23 Sep 2021 16:09) (76 - 76)  RR: 18 (24 Sep 2021 08:50) (18 - 18)  SpO2: 97% (24 Sep 2021 08:50) (97% - 100%)    PHYSICAL EXAM:    Constitutional: NAD, Alert and Interactive  Eyes: Anicteric sclera  Respiratory: CTAB  Cardiovascular: S1 and S2, RRR, no M/G/R  Gastrointestinal: Tenderness to deep palpation to RUQ and RLQ; +Hepatomegaly; No dullness to percussion; normoactive BS; +polanco's sign elicited  Extremities: No peripheral edema or bilateral lower extremity edema  Psychiatric: Normal mood, normal affect  Skin: No rashes or bruises; no evidence of jaundice    LABS:                        9.7    7.62  )-----------( 36       ( 24 Sep 2021 06:31 )             29.3     09-24    132<L>  |  101  |  13  ----------------------------<  98  3.7   |  19  |  0.8    Ca    8.0<L>      24 Sep 2021 06:31  Mg     1.9     09-24    TPro  6.4  /  Alb  3.4<L>  /  TBili  0.9  /  DBili  x   /  AST  91<H>  /  ALT  96<H>  /  AlkPhos  403<H>  09-24    PT/INR - ( 24 Sep 2021 00:50 )   PT: 12.60 sec;   INR: 1.10 ratio         PTT - ( 24 Sep 2021 00:50 )  PTT:40.2 sec  LIVER FUNCTIONS - ( 24 Sep 2021 06:31 )  Alb: 3.4 g/dL / Pro: 6.4 g/dL / ALK PHOS: 403 U/L / ALT: 96 U/L / AST: 91 U/L / GGT: x             RADIOLOGY & ADDITIONAL STUDIES:    < from: CT Abdomen and Pelvis w/ IV Cont (09.23.21 @ 19:04) >  EXAM:  CT ABDOMEN AND PELVIS IC            PROCEDURE DATE:  09/23/2021            INTERPRETATION:  CLINICAL STATEMENT: Fever with transaminitis.    TECHNIQUE: Contiguous axial CT images were obtained from the lower chest to the pubic symphysis following administration of 100cc Optiray 320 intravenous contrast.  Oral contrast was not administered.  Reformatted images in the coronal and sagittal planes were acquired.    COMPARISON CT: None.    OTHER STUDIES USED FOR CORRELATION: None.      FINDINGS:    LOWER CHEST: Trace right pleural effusion with right basilar atelectasis..    HEPATOBILIARY: Periportal edema. Extensive gallbladder wall edema/thickening. Hepatomegaly..    SPLEEN: Splenomegaly measuring 17.4 cm in craniocaudal dimension.    PANCREAS: Unremarkable.    ADRENAL GLANDS: Unremarkable.    KIDNEYS: Symmetric renal enhancement without hydronephrosis bilaterally. Right midpole 0.6 cm nonobstructing calculus (series 4 image 110)..    ABDOMINOPELVIC NODES: Unremarkable.    PELVIC ORGANS: Left adnexal 2.1 cm cyst..    PERITONEUM/MESENTERY/BOWEL: Trace free pelvic ascites. No bowel obstruction or pneumoperitoneum. Normal caliber appendix    BONES/SOFT TISSUES: Unremarkable.    OTHER: Dilated bilateral gonadal veins.      IMPRESSION:    Periportal edema and extensive gallbladder wall edema/thickening. Findings are nonspecific.    Hepatosplenomegaly.    Trace free pelvic ascites and right pleural effusion.    --- End of Report ---    < end of copied text >  < from: US Abdomen Upper Quadrant Right (09.23.21 @ 15:40) >  EXAM:  US ABDOMEN RT UPR QUADRANT            PROCEDURE DATE:  09/23/2021            INTERPRETATION:  CLINICAL INFORMATION: Elevated liver function tests    COMPARISON: None available.    TECHNIQUE: Sonography of the right upper quadrant.      FINDINGS:    Liver: Hepatomegaly. No focal lesion.  Bile ducts: Normal caliber. Common bile duct measures 4 mm.  Gallbladder: Thickened and mildly edematous gallbladder wall to 0.5 cm. Surrounding pericholecystic fluid. No cholelithiasis. No reported sonographic Polanco's sign..  Pancreas: Visualized portions are within normal limits.  Right kidney: 11.0 cm. No hydronephrosis.  Ascites: None.      IMPRESSION:    Diffusely thickened edematous gallbladder wall and surrounding pericholecystic fluid. Findings concerning for cholecystitis.    Hepatomegaly.    --- End of Report ---        < end of copied text >

## 2021-09-24 NOTE — CONSULT NOTE ADULT - SUBJECTIVE AND OBJECTIVE BOX
Addiction medicine consult placed for SIMRAN OJEDA. Per resident, Dr. Guillen, no detox recommendations necessary at this time, patient's symptoms are currently well-controlled, though patient does require referral for services.  [facility] team social workers aware, will follow patient.

## 2021-09-24 NOTE — PROGRESS NOTE ADULT - ASSESSMENT
51 yo F with PMHx anxiety who presents to ED for abd pain x 1ms.     #Acute cholecystitis - ruled outed   #Transaminitis - consistent with hepatitis   -NPO / IV fluids / PPI   -continue zosyn    -blood culture pending   -HIDA negative  - surgery recs no abx  -Hepatitis panel pending   -GI consult appreciated     #Alcohol abuse  - CIWA protocol with ativan  - folic acid + thiamine  - addiction medicine consulted     #Hepatosplenomegaly / thrombocytosis / fever ( at least a months)/ recent sore throat and inguinal lymph node tenderness r/o infectious process r/o neoplastic process   -Peripheral smear   -Hem/Onc eval   -check CLD labs and infectious profile   -serial CBC   -PRN pain ( no APAP)     Chronic anxiety - on Ativan RTC   -c/w ativan PRN    DVT prophylaxis - SCD to LEs while in bed, otherwise free to ambulate

## 2021-09-24 NOTE — ED ADULT NURSE REASSESSMENT NOTE - NS ED NURSE REASSESS COMMENT FT1
transferred to [place] [id number] , report given to new primary nurse Octavio , pt AO x 4 , denies abdominal pain this time , no SOB , no tremors noted , no syncopal episode , valerie , no confusion noted , no seizures noted , no agitation , no restlessness , no vomiting noted , denies chest pain , denies headache , denies dizziness , denies nausea , denies hallucination , awaiting for hospital bed , for medicine admission

## 2021-09-24 NOTE — PROGRESS NOTE ADULT - ASSESSMENT
50 years old female with no PMH, c/o fever, chills and abdominal pain. Patient refers 2 weeks ago had sore throat and nasal congestion,  for the last 4 days she is presenting with high grade fevers (103), sweating and chills, associated with mild abdominal pain on epigastrium, 5/10, pressure like, with diarrhea and 1 episode of emesis yesterday.      PLAN:  - No acute surgical intervention  - Keep NPO  - IVFs  - Pain management  - Monitor for fever  - F/H HIDA scan  - GI consult given elevated LFTs and hepatosplenomegaly         spectra [id number]

## 2021-09-24 NOTE — PROGRESS NOTE ADULT - ASSESSMENT
#Acute cholecystitis   #Transaminitis  -NPO / IV fluids / PPI   -IV abx   -check blood Cx  -HIDA ( surgery to follow )  - surgery recs no abx    #Alcohol abuse  - CIWA protocol with ativan  - folic acid + thiamine  - librium contraindicated secondary to transaminitis    Hepatosplenomegaly / thrombocytosis / fever ( at least a months)/ recent sore throat and inguinal lymph node tenderness r/o infectious process r/o neoplastic process   -Peripheral smear   -Hem/Onc eval   -check CLD labs and infectious profile   -serial CBC   -PRN pain ( no APAP)     Chronic anxiety - on Ativan RTC   -c/w ativan PRN    DVT prophylaxis - SCD to LEs while in bed, otherwise free to ambulate

## 2021-09-24 NOTE — CONSULT NOTE ADULT - ASSESSMENT
The patient is a 50 year old female with a PMH of IBS and anxiety, she is being admitted for fever and RUQ abdominal pain, suspected cholecystitis. Heme/Onc is being consulted for thrombocytopenia.    #Thrombocytopenia  -Likely secondary due to patients ongoing infection  -      #Normocytic anemia  -Recommended order: iron, % sat, TIBC, ferritin to r/o iron def anemia  -Recommended order: Reticulocyte count, LDH, haptoglobin, B12, ferritin to r/o other causes of anemia  -        ---NOT FINALIZED--- The patient is a 50 year old female with a PMH of IBS and anxiety, she is being admitted for fever and RUQ abdominal pain, suspected cholecystitis. Heme/Onc is being consulted for thrombocytopenia and hepatosplenomegaly.    #Thrombocytopenia  -Likely secondary due to patients ongoing infection  -      #Normocytic anemia  -Recommended order: iron, % sat, TIBC, ferritin to r/o iron def anemia  -Recommended order: Reticulocyte count, LDH, haptoglobin, B12, ferritin to r/o other causes of anemia  -    #hepatosplenomegaly  -As per abdominal CT: IMPRESSION: Periportal edema and extensive gallbladder wall edema/thickening. Findings are nonspecific. Hepatosplenomegaly. Trace free pelvic ascites and right pleural effusion.   -As per RUQ US: IMPRESSION: Diffusely thickened edematous gallbladder wall and surrounding pericholecystic fluid. Findings concerning for cholecystitis. Hepatomegaly.  -Recommended consult with GI to r/o hepatitis  Recommended     ---NOT FINALIZED--- The patient is a 50 year old female with a PMH of IBS and anxiety, she is being admitted for fever and RUQ abdominal pain, suspected cholecystitis. Heme/Onc is being consulted for thrombocytopenia and hepatosplenomegaly.    #Thrombocytopenia  -Likely secondary due to patients ongoing infection   -May be secondary to anemia, r/o causes of anemia  -Continue to monitor counts       #Normocytic anemia  -Recommended order: iron, % sat, TIBC, ferritin to r/o iron def anemia  -Recommended order: Reticulocyte count, LDH, haptoglobin, B12, folate to r/o other causes of anemia  -    #hepatosplenomegaly  -As per abdominal CT: IMPRESSION: Periportal edema and extensive gallbladder wall edema/thickening. Findings are nonspecific. Hepatosplenomegaly. Trace free pelvic ascites and right pleural effusion.   -As per RUQ US: IMPRESSION: Diffusely thickened edematous gallbladder wall and surrounding pericholecystic fluid. Findings concerning for cholecystitis. Hepatomegaly.  -Recommended consult with GI to r/o hepatitis       ---NOT FINALIZED--- The patient is a 50 year old female with a PMH of IBS and anxiety, she is being admitted for fever and RUQ abdominal pain, suspected cholecystitis. Heme/Onc is being consulted for thrombocytopenia and hepatosplenomegaly.    #Thrombocytopenia -acute (no previous records to compare)  #Chronic anemia  -Could be secondary to underlying infectious/viral etiology +/-alcohol use; need to r/o hematological neoplastic  process in the setting of B symptoms (fever, night sweats) and hepatosplenomegaly  -Peripheral smear reviewed as above    #Hepatosplenomegaly  #?Cholecystitis with elevated LFTs -HIDA scan neg  -As per abdominal CT: IMPRESSION: Periportal edema and extensive gallbladder wall edema/thickening. Findings are nonspecific. Hepatosplenomegaly. Trace free pelvic ascites and right pleural effusion.   -As per RUQ US: IMPRESSION: Diffusely thickened edematous gallbladder wall and surrounding pericholecystic fluid. Findings concerning for cholecystitis. Hepatomegaly.  -r/o hematological neoplastic  process in the setting of B symptoms (fever, night sweats)     PLAN:    -Send viral etiology (Hepatitis viral panel, EBV, CMV)  -Send iron studies, % sat, TIBC, ferritin, reticulocyte count, haptoglobin, B12, folate   -Send Flow cytometry  -Send LDH level as well   -Send DIC panel   -Follow up with ID and GI for other workup    Will follow.

## 2021-09-24 NOTE — CONSULT NOTE ADULT - ASSESSMENT
Assessment  Ms. Ceci Baum is a 49 yo woman  who presented to the ED for 1 month history of abdominal pain. She states that the abdominal pain worsened 5 days ago, with pain in midepigastric area radiating to RUQ and RLQ. She also reports having daily fevers (Tmax: 103F x 5 days ago; 101.9F today), chills, nausea, non-bloody/non-bilious vomiting, and night sweats. No reported weight loss or decrease in appetite. She also endorses taking 3G Tylenol alternating with Motrin daily for headache and abdominal pain. She states she drinks "a couple" glasses of wine a night. Most recent Plt count-36K. RUQ U/S demonstrated cholecystitis and hepatomegaly.    #Cholecystitis  -RUQ U/S demonstrates Cholecystitis with hepatomegaly; Pending results of HIDA scan to proceed with Lap Niesha vs Drainage  -Surgery following  -Keep NPO  -Monitor for Fevers; Follow up Blood Cx; Serial CBCs  -Continue Zosyn and IVF  -Pain management  -Protonix for GI Prophylaxis    #Hepatosplenomegaly  -Follow up Hepatitis/CMV/EBV Panel  -Monitor LFT's  -Heme/Onc following

## 2021-09-24 NOTE — CONSULT NOTE ADULT - ATTENDING COMMENTS
Acute Cholecystitis to be confirmed by HIDA scan. Plt 36 etiology unclear obtain hematology consult.
patient seen, agree with above.  the patient presents with increased abdominal girth over the last few months and a 7 days history of fever, nausea and epigastric/RUQ pain.  exam shows mild tenderness R side, enalrged liver.  CT shows hepatosplenomegaly, edema of the GB wall.  impression: the patient has a new finding of hepatosplenomegaly with evidence of hypersplenism (low platelet count of 36k), also possibly a second daignosis of cholecystits.   recommend HIDA scan to rule it out, high risk for surgery given the new diagnosis and thrombocytopenia that needs wirk  up, if HIDA is positive she needs perc drain.  above was discussed with the patient and  in detail, they understood. continue NPO, antibiotics. follow scan.
Patient interviewed and examined at the bedside . History of fever , night sweats for few weeks , hepatosplenomegaly , anemia , thrombocytopenia and peripheral lymphocytosis . rule out lymphoproliferative disorder , less likely infection . Needs CT chest with contrast , consider bone marrow.

## 2021-09-25 DIAGNOSIS — D69.6 THROMBOCYTOPENIA, UNSPECIFIED: ICD-10-CM

## 2021-09-25 DIAGNOSIS — R50.9 FEVER, UNSPECIFIED: ICD-10-CM

## 2021-09-25 DIAGNOSIS — R74.01 ELEVATION OF LEVELS OF LIVER TRANSAMINASE LEVELS: ICD-10-CM

## 2021-09-25 DIAGNOSIS — R10.9 UNSPECIFIED ABDOMINAL PAIN: ICD-10-CM

## 2021-09-25 LAB
ALBUMIN SERPL ELPH-MCNC: 3.2 G/DL — LOW (ref 3.5–5.2)
ALP SERPL-CCNC: 298 U/L — HIGH (ref 30–115)
ALT FLD-CCNC: 62 U/L — HIGH (ref 0–41)
ANION GAP SERPL CALC-SCNC: 9 MMOL/L — SIGNIFICANT CHANGE UP (ref 7–14)
AST SERPL-CCNC: 53 U/L — HIGH (ref 0–41)
BASOPHILS # BLD AUTO: 0.03 K/UL — SIGNIFICANT CHANGE UP (ref 0–0.2)
BASOPHILS NFR BLD AUTO: 0.3 % — SIGNIFICANT CHANGE UP (ref 0–1)
BILIRUB SERPL-MCNC: 0.6 MG/DL — SIGNIFICANT CHANGE UP (ref 0.2–1.2)
BUN SERPL-MCNC: 11 MG/DL — SIGNIFICANT CHANGE UP (ref 10–20)
CALCIUM SERPL-MCNC: 7.8 MG/DL — LOW (ref 8.5–10.1)
CHLORIDE SERPL-SCNC: 104 MMOL/L — SIGNIFICANT CHANGE UP (ref 98–110)
CO2 SERPL-SCNC: 21 MMOL/L — SIGNIFICANT CHANGE UP (ref 17–32)
CREAT SERPL-MCNC: 0.8 MG/DL — SIGNIFICANT CHANGE UP (ref 0.7–1.5)
D DIMER BLD IA.RAPID-MCNC: 363 NG/ML DDU — HIGH (ref 0–230)
DIR ANTIGLOB POLYSPECIFIC INTERPRETATION: SIGNIFICANT CHANGE UP
EOSINOPHIL # BLD AUTO: 0 K/UL — SIGNIFICANT CHANGE UP (ref 0–0.7)
EOSINOPHIL NFR BLD AUTO: 0 % — SIGNIFICANT CHANGE UP (ref 0–8)
FERRITIN SERPL-MCNC: 446 NG/ML — HIGH (ref 15–150)
FIBRINOGEN PPP-MCNC: 343 MG/DL — SIGNIFICANT CHANGE UP (ref 204.4–570.6)
GLUCOSE SERPL-MCNC: 117 MG/DL — HIGH (ref 70–99)
HCT VFR BLD CALC: 27.8 % — LOW (ref 37–47)
HGB BLD-MCNC: 9.3 G/DL — LOW (ref 12–16)
IMM GRANULOCYTES NFR BLD AUTO: 1 % — HIGH (ref 0.1–0.3)
IRON SATN MFR SERPL: 28 % — SIGNIFICANT CHANGE UP (ref 15–50)
IRON SATN MFR SERPL: 84 UG/DL — SIGNIFICANT CHANGE UP (ref 35–150)
LDH SERPL L TO P-CCNC: 365 — HIGH (ref 50–242)
LKM AB SER-ACNC: <20.1 UNITS — SIGNIFICANT CHANGE UP (ref 0–20)
LYMPHOCYTES # BLD AUTO: 8.21 K/UL — HIGH (ref 1.2–3.4)
LYMPHOCYTES # BLD AUTO: 85.8 % — HIGH (ref 20.5–51.1)
MCHC RBC-ENTMCNC: 29 PG — SIGNIFICANT CHANGE UP (ref 27–31)
MCHC RBC-ENTMCNC: 33.5 G/DL — SIGNIFICANT CHANGE UP (ref 32–37)
MCV RBC AUTO: 86.6 FL — SIGNIFICANT CHANGE UP (ref 81–99)
MONOCYTES # BLD AUTO: 0.34 K/UL — SIGNIFICANT CHANGE UP (ref 0.1–0.6)
MONOCYTES NFR BLD AUTO: 3.6 % — SIGNIFICANT CHANGE UP (ref 1.7–9.3)
NEUTROPHILS # BLD AUTO: 0.89 K/UL — LOW (ref 1.4–6.5)
NEUTROPHILS NFR BLD AUTO: 9.3 % — LOW (ref 42.2–75.2)
NRBC # BLD: 0 /100 WBCS — SIGNIFICANT CHANGE UP (ref 0–0)
PLATELET # BLD AUTO: 40 K/UL — LOW (ref 130–400)
POTASSIUM SERPL-MCNC: 4.2 MMOL/L — SIGNIFICANT CHANGE UP (ref 3.5–5)
POTASSIUM SERPL-SCNC: 4.2 MMOL/L — SIGNIFICANT CHANGE UP (ref 3.5–5)
PROT SERPL-MCNC: 6.1 G/DL — SIGNIFICANT CHANGE UP (ref 6–8)
RBC # BLD: 3.21 M/UL — LOW (ref 4.2–5.4)
RBC # BLD: 3.27 M/UL — LOW (ref 4.2–5.4)
RBC # FLD: 15.7 % — HIGH (ref 11.5–14.5)
RETICS #: 29.8 K/UL — SIGNIFICANT CHANGE UP (ref 25–125)
RETICS/RBC NFR: 0.9 % — SIGNIFICANT CHANGE UP (ref 0.5–1.5)
SODIUM SERPL-SCNC: 134 MMOL/L — LOW (ref 135–146)
TIBC SERPL-MCNC: 301 UG/DL — SIGNIFICANT CHANGE UP (ref 220–430)
UIBC SERPL-MCNC: 217 UG/DL — SIGNIFICANT CHANGE UP (ref 110–370)
WBC # BLD: 9.57 K/UL — SIGNIFICANT CHANGE UP (ref 4.8–10.8)
WBC # FLD AUTO: 9.57 K/UL — SIGNIFICANT CHANGE UP (ref 4.8–10.8)

## 2021-09-25 PROCEDURE — 99233 SBSQ HOSP IP/OBS HIGH 50: CPT

## 2021-09-25 RX ORDER — LANOLIN ALCOHOL/MO/W.PET/CERES
10 CREAM (GRAM) TOPICAL AT BEDTIME
Refills: 0 | Status: DISCONTINUED | OUTPATIENT
Start: 2021-09-25 | End: 2021-09-28

## 2021-09-25 RX ORDER — ACETAMINOPHEN 500 MG
650 TABLET ORAL EVERY 6 HOURS
Refills: 0 | Status: DISCONTINUED | OUTPATIENT
Start: 2021-09-25 | End: 2021-09-28

## 2021-09-25 RX ORDER — ACETAMINOPHEN 500 MG
650 TABLET ORAL EVERY 6 HOURS
Refills: 0 | Status: DISCONTINUED | OUTPATIENT
Start: 2021-09-25 | End: 2021-09-25

## 2021-09-25 RX ADMIN — PIPERACILLIN AND TAZOBACTAM 25 GRAM(S): 4; .5 INJECTION, POWDER, LYOPHILIZED, FOR SOLUTION INTRAVENOUS at 13:14

## 2021-09-25 RX ADMIN — SODIUM CHLORIDE 75 MILLILITER(S): 9 INJECTION INTRAMUSCULAR; INTRAVENOUS; SUBCUTANEOUS at 21:41

## 2021-09-25 RX ADMIN — Medication 20 MILLIGRAM(S): at 13:12

## 2021-09-25 RX ADMIN — PIPERACILLIN AND TAZOBACTAM 25 GRAM(S): 4; .5 INJECTION, POWDER, LYOPHILIZED, FOR SOLUTION INTRAVENOUS at 21:44

## 2021-09-25 RX ADMIN — Medication 100 MILLIGRAM(S): at 13:57

## 2021-09-25 RX ADMIN — Medication 1 MILLIGRAM(S): at 13:12

## 2021-09-25 RX ADMIN — CHLORHEXIDINE GLUCONATE 1 APPLICATION(S): 213 SOLUTION TOPICAL at 05:30

## 2021-09-25 RX ADMIN — Medication 1.5 MILLIGRAM(S): at 13:56

## 2021-09-25 RX ADMIN — PIPERACILLIN AND TAZOBACTAM 25 GRAM(S): 4; .5 INJECTION, POWDER, LYOPHILIZED, FOR SOLUTION INTRAVENOUS at 05:29

## 2021-09-25 RX ADMIN — Medication 10 MILLIGRAM(S): at 23:40

## 2021-09-25 RX ADMIN — PANTOPRAZOLE SODIUM 40 MILLIGRAM(S): 20 TABLET, DELAYED RELEASE ORAL at 05:29

## 2021-09-25 NOTE — PROGRESS NOTE ADULT - SUBJECTIVE AND OBJECTIVE BOX
SIMRAN OJEDA 50y Female  MRN#: [id number]   CODE STATUS:________    Hospital Day: 2d    Pt is currently admitted with the primary diagnosis of     SUBJECTIVE  Hospital Course  Pt states that for the last month she has been taking 3G Tylenol per day due to daily H/A and abd pain which she thought were due to premenopausal symptoms. Admits to drinking 2-4 glasses of wine for last month to cope with recent loss of her father. Denies drinking first thing in the morning and feeling guilty about drinking. However, since , pt began to feel sporadic fevers as high as Tmax 103F associated with generalized weakness, chills and abd pain. Pain is located in the midepigastric region, Intermittent, radiating to the LLQ, 6-10/10, a/w nausea and 2 episodes of nonbilious vomiting.     In the ED, VS T Max: 97.9F HR: 83 BP: 111/55 RR: 18 SpO2: 100%. Labs notable for , , , Platelet count of 39, Hg 9.6, Na 132. UA mildly (+).  US showed diffusely thickened edematous gallbladder wall and surrounding pericholecystic fluid, suspicious for cholecystitis, as well as hepatomegaly. CT A/P c/w periportal edema and extensive gallbladder wall edema/thickening, Hepatosplenomegaly, Trace free pelvic ascites and right pleural effusion.     Pt was seen by sx who rec GI eval and admission to medicine for hepatitis vs hematological process causing hepatosplenomegaly. Seen by GI who rec hepatitis panel, CMV and EBV and HIDA.     Pt is being admitted for acute cholecystitis and possible concomitant neoplastic disease.        Overnight events     Subjective complaints     Present Today:   - Pardo:  No [  ], Yes [   ] : Indication:     - Type of IV Access:       .. CVC/Piccline:  No [  ], Yes [   ] : Indication:       .. Midline: No [  ], Yes [   ] : Indication:                                              OBJECTIVE  PAST MEDICAL & SURGICAL HISTORY                                              ALLERGIES:  No Known Allergies                           HOME MEDICATIONS  Home Medications:  Ativan: 1.5 milligram(s) orally once a day (24 Sep 2021 01:22)  PROzac 20 mg oral capsule: 1 cap(s) orally once a day (24 Sep 2021 01:22)                           MEDICATIONS:  STANDING MEDICATIONS  chlorhexidine 4% Liquid 1 Application(s) Topical <User Schedule>  FLUoxetine 20 milliGRAM(s) Oral daily  folic acid 1 milliGRAM(s) Oral daily  LORazepam     Tablet 1.5 milliGRAM(s) Oral daily  pantoprazole    Tablet 40 milliGRAM(s) Oral before breakfast  piperacillin/tazobactam IVPB.. 3.375 Gram(s) IV Intermittent every 8 hours  sodium chloride 0.9%. 1000 milliLiter(s) IV Continuous <Continuous>  thiamine 100 milliGRAM(s) Oral daily    PRN MEDICATIONS  acetaminophen   Tablet .. 650 milliGRAM(s) Oral every 6 hours PRN  ketorolac   Injectable 15 milliGRAM(s) IV Push every 8 hours PRN  LORazepam     Tablet 2 milliGRAM(s) Oral every 1 hour PRN                                            ------------------------------------------------------------  VITAL SIGNS: Last 24 Hours  T(C): 36.7 (25 Sep 2021 12:15), Max: 37.6 (24 Sep 2021 21:00)  T(F): 98 (25 Sep 2021 12:15), Max: 99.6 (24 Sep 2021 21:00)  HR: 88 (25 Sep 2021 12:15) (83 - 94)  BP: 108/69 (25 Sep 2021 12:15) (108/69 - 115/58)  BP(mean): --  RR: 18 (25 Sep 2021 12:15) (17 - 18)  SpO2: 98% (25 Sep 2021 05:00) (98% - 100%)                                               LABS:                        9.3    9.57  )-----------( 40       ( 25 Sep 2021 07:54 )             27.8     09-25    134<L>  |  104  |  11  ----------------------------<  117<H>  4.2   |  21  |  0.8    Ca    7.8<L>      25 Sep 2021 07:54  Mg     1.9     09-24    TPro  6.1  /  Alb  3.2<L>  /  TBili  0.6  /  DBili  x   /  AST  53<H>  /  ALT  62<H>  /  AlkPhos  298<H>  09-25    PT/INR - ( 24 Sep 2021 00:50 )   PT: 12.60 sec;   INR: 1.10 ratio         PTT - ( 24 Sep 2021 00:50 )  PTT:40.2 sec  Urinalysis Basic - ( 23 Sep 2021 15:55 )    Color: Zuleyma / Appearance: Turbid / S.010 / pH: x  Gluc: x / Ketone: Negative  / Bili: Negative / Urobili: <2 mg/dL   Blood: x / Protein: 30 mg/dL / Nitrite: Negative   Leuk Esterase: Small / RBC: >50 /HPF / WBC x   Sq Epi: x / Non Sq Epi: x / Bacteria: Few                                                            RADIOLOGY:        PHYSICAL EXAM:  GENERAL: NAD  EYES: conjunctiva and sclera clear  ENT: Moist mucous membranes  NECK: Supple, No JVD  CHEST/LUNG: Clear to auscultation bilaterally; No rales, rhonchi, wheezing, or rubs. Unlabored respirations  HEART: Regular rate and rhythm; No murmurs, rubs, or gallops  ABDOMEN: BSx4; Soft, mild-moderate epigastric and RUQ tenderness, Art -ve, nondistended  EXTREMITIES: No LE edema  NERVOUS SYSTEM:  A&Ox3                                          SIMRAN OJEDA 50y Female  MRN#: [id number]   CODE STATUS:________    Hospital Day: 2d    Pt is currently admitted with the primary diagnosis of     SUBJECTIVE  Hospital Course  Pt states that for the last month she has been taking 3G Tylenol per day due to daily H/A and abd pain which she thought were due to premenopausal symptoms. Admits to drinking 2-4 glasses of wine for last month to cope with recent loss of her father. Denies drinking first thing in the morning and feeling guilty about drinking. However, since , pt began to feel sporadic fevers as high as Tmax 103F associated with generalized weakness, chills and abd pain. Pain is located in the midepigastric region, Intermittent, radiating to the LLQ, 6-10/10, a/w nausea and 2 episodes of nonbilious vomiting.     In the ED, VS T Max: 97.9F HR: 83 BP: 111/55 RR: 18 SpO2: 100%. Labs notable for , , , Platelet count of 39, Hg 9.6, Na 132. UA mildly (+).  US showed diffusely thickened edematous gallbladder wall and surrounding pericholecystic fluid, suspicious for cholecystitis, as well as hepatomegaly. CT A/P c/w periportal edema and extensive gallbladder wall edema/thickening, Hepatosplenomegaly, Trace free pelvic ascites and right pleural effusion.     Pt was seen by sx who rec GI eval and admission to medicine for hepatitis vs hematological process causing hepatosplenomegaly. Seen by GI who rec hepatitis panel, CMV and EBV and HIDA.     Pt is being admitted for acute cholecystitis and possible concomitant neoplastic disease.        Overnight events     Subjective complaints     Present Today:   - Pardo:  No [x  ], Yes [   ] : Indication:     - Type of IV Access:       .. CVC/Piccline:  No [ x ], Yes [   ] : Indication:       .. Midline: No [ x ], Yes [   ] : Indication:                                              OBJECTIVE  PAST MEDICAL & SURGICAL HISTORY                                              ALLERGIES:  No Known Allergies                           HOME MEDICATIONS  Home Medications:  Ativan: 1.5 milligram(s) orally once a day (24 Sep 2021 01:22)  PROzac 20 mg oral capsule: 1 cap(s) orally once a day (24 Sep 2021 01:22)                           MEDICATIONS:  STANDING MEDICATIONS  chlorhexidine 4% Liquid 1 Application(s) Topical <User Schedule>  FLUoxetine 20 milliGRAM(s) Oral daily  folic acid 1 milliGRAM(s) Oral daily  LORazepam     Tablet 1.5 milliGRAM(s) Oral daily  pantoprazole    Tablet 40 milliGRAM(s) Oral before breakfast  piperacillin/tazobactam IVPB.. 3.375 Gram(s) IV Intermittent every 8 hours  sodium chloride 0.9%. 1000 milliLiter(s) IV Continuous <Continuous>  thiamine 100 milliGRAM(s) Oral daily    PRN MEDICATIONS  acetaminophen   Tablet .. 650 milliGRAM(s) Oral every 6 hours PRN  ketorolac   Injectable 15 milliGRAM(s) IV Push every 8 hours PRN  LORazepam     Tablet 2 milliGRAM(s) Oral every 1 hour PRN                                            ------------------------------------------------------------  VITAL SIGNS: Last 24 Hours  T(C): 36.7 (25 Sep 2021 12:15), Max: 37.6 (24 Sep 2021 21:00)  T(F): 98 (25 Sep 2021 12:15), Max: 99.6 (24 Sep 2021 21:00)  HR: 88 (25 Sep 2021 12:15) (83 - 94)  BP: 108/69 (25 Sep 2021 12:15) (108/69 - 115/58)  BP(mean): --  RR: 18 (25 Sep 2021 12:15) (17 - 18)  SpO2: 98% (25 Sep 2021 05:00) (98% - 100%)                                               LABS:                        9.3    9.57  )-----------( 40       ( 25 Sep 2021 07:54 )             27.8     09-25    134<L>  |  104  |  11  ----------------------------<  117<H>  4.2   |  21  |  0.8    Ca    7.8<L>      25 Sep 2021 07:54  Mg     1.9     09-24    TPro  6.1  /  Alb  3.2<L>  /  TBili  0.6  /  DBili  x   /  AST  53<H>  /  ALT  62<H>  /  AlkPhos  298<H>  09-25    PT/INR - ( 24 Sep 2021 00:50 )   PT: 12.60 sec;   INR: 1.10 ratio         PTT - ( 24 Sep 2021 00:50 )  PTT:40.2 sec  Urinalysis Basic - ( 23 Sep 2021 15:55 )    Color: Zuleyma / Appearance: Turbid / S.010 / pH: x  Gluc: x / Ketone: Negative  / Bili: Negative / Urobili: <2 mg/dL   Blood: x / Protein: 30 mg/dL / Nitrite: Negative   Leuk Esterase: Small / RBC: >50 /HPF / WBC x   Sq Epi: x / Non Sq Epi: x / Bacteria: Few                                                            RADIOLOGY:        PHYSICAL EXAM:  GENERAL: NAD  EYES: conjunctiva and sclera clear  ENT: Moist mucous membranes  NECK: Supple, No JVD  CHEST/LUNG: Clear to auscultation bilaterally; No rales, rhonchi, wheezing, or rubs. Unlabored respirations  HEART: Regular rate and rhythm; No murmurs, rubs, or gallops  ABDOMEN: BSx4; Soft, mild-moderate epigastric and RUQ tenderness, Art -ve, nondistended  EXTREMITIES: No LE edema  NERVOUS SYSTEM:  A&Ox3

## 2021-09-25 NOTE — PROGRESS NOTE ADULT - ATTENDING COMMENTS
Patient seen and examined at bedside.   no acute overnight events noted   last episode of fever of 101.9 yesterday morning     Plan:   Ms. Ceci Baum is a 51 yo woman  who presented to the ED for 1 month history of abdominal pain and fever.    # Abdominal pain and fever: R/O passed stone vs viral hepatitis  - CT abdomen U/S demonstrates no GS GB edema, eloise-cholecystic fluid with hepatomegaly, splenomegaly but HIDA neg  - LFTS trending down  - CLD: HAV/HBV/HCV/EBVIgM neg  - follow Anti HEV, Serum Ferritin, Transferrin Saturation, Ceruloplasmin level, EVARISTO, SMA, immunoglobulins panel, AMA, Anti LK microsomal Ab, anti-soluble liver Ag, EBV PCR, CMV PCR, VZV and HSV serology and PCR  - follow MRCP  - on clear liquid diet --> advance as tolerated    #Thrombocytopenia with hepatosplenomegaly   - r/o myeloproliferative disorders, ITP vs others.  - hepatitis panel negative, EBV antibodies - suggestive of past infection   - follow iron studies, % sat, TIBC, ferritin, reticulocyte count, haptoglobin, B12, folate, flow cytometry, ldh and DIC panel   - possible bone marrow biopsy as per hem/onc    # alcohol abuse: reports recent use with loss of family member, no signs of withdrawal on exam, catch team consult, patient counselled     # anxiety: continue prn ativan    Provider handoff: pending mrcp and thrombocytopenia work up Patient seen and examined at bedside.   no acute overnight events noted   last episode of fever of 101.9 yesterday morning     Plan:   Ms. Ceci Baum is a 49 yo woman  who presented to the ED for 1 month history of abdominal pain and fever.    # Abdominal pain and fever: R/O passed stone vs viral hepatitis  # transaminitis   - CT abdomen U/S demonstrates no GS GB edema, eloise-cholecystic fluid with hepatomegaly, splenomegaly but HIDA neg  - LFTS trending down  - CLD: HAV/HBV/HCV/EBVIgM neg  - follow Anti HEV, Serum Ferritin, Transferrin Saturation, Ceruloplasmin level, EVARISTO, SMA, immunoglobulins panel, AMA, Anti LK microsomal Ab, anti-soluble liver Ag, EBV PCR, CMV PCR, VZV and HSV serology and PCR  - follow MRCP  - on clear liquid diet --> advance as tolerated    #Thrombocytopenia with hepatosplenomegaly   - r/o myeloproliferative disorders, ITP vs others.  - hepatitis panel negative, EBV antibodies - suggestive of past infection   - follow iron studies, % sat, TIBC, ferritin, reticulocyte count, haptoglobin, B12, folate, flow cytometry, ldh and DIC panel   - possible bone marrow biopsy as per hem/onc    # alcohol abuse: reports recent use with loss of family member, no signs of withdrawal on exam, catch team consult, patient counselled     # anxiety: continue prn ativan    Provider handoff: pending mrcp and thrombocytopenia work up

## 2021-09-25 NOTE — PROGRESS NOTE ADULT - ASSESSMENT
50 years old female with no PMH, c/o fever, chills and abdominal pain. Patient refers 2 weeks ago had sore throat and nasal congestion,  for the last 4 days she is presenting with high grade fevers (103), sweating and chills, associated with mild abdominal pain on epigastrium, 5/10, pressure like, with diarrhea and 1 episode of emesis yesterday. HIDA: patency of cystic duct, no acute surgical intervention indicated at this moment.     PLAN:  - No acute surgical intervention  - IVFs  - Pain management  - Monitor for fever  - F/U GI recs  - Surgery will sign off now  - Please notify if further evaluation needed    spectra [phone]

## 2021-09-25 NOTE — PROGRESS NOTE ADULT - ASSESSMENT
ASSESSMENT & PLAN    49 yo F with PMHx anxiety and post prandial abdominal pain who presents to ED for RUQ abd pain x 1 week duration associated with fever. US suggestive of cholecystitis. HIDA -ve    #Acute cholecystitis - ruled outed   #Transaminitis - consistent with hepatitis   # Abdominal pain and fever: R/O passed stone vs viral hepatitis  -NPO / IV fluids / PPI   -continue zosyn  (patient still febrile)  -blood culture pending   -HIDA negative  - no surgical intervention  -Hepatitis panel -ve, other hepatitis studies (HEV) pending  -GI consult appreciated   - Anti HEV, Serum Ferritin, Transferrin Saturation, Ceruloplasmin level, EVARISTO, SMA, immunoglobulins panel, AMA, Anti LK microsomal Ab, anti-soluble liver Ag.  - MRCP  - Get EBV PCR, CMV PCR, VZV and HSV serology and PCR  - advance diet as tolerated.    #Alcohol abuse  - CIWA protocol with ativan  - folic acid + thiamine  - addiction medicine consulted (off librium for transaminitis)    #Hepatosplenomegaly / thrombocytosis / fever ( at least a months)/ recent sore throat and inguinal lymph node tenderness r/o infectious process r/o neoplastic process   -Peripheral smear -> mild schistocytes, immature lymphocytes, no blasts, thrombocytopenia  -Hem/Onc eval   -f/u CLD labs and infectious profile   -serial CBC   -f/u viral etiology (Hepatitis viral panel [-ve], EBV [serology -ve], CMV)  -f/u [iron studies, % sat, TIBC] -> nl, ferritin, reticulocyte count, haptoglobin, B12, folate   -f/u Flow cytometry  -f/u LDH   -f/u DIC panel   -PRN pain ( no Ketorolac Q8, acetaminophen Q6 PRN MDD 3g)    Chronic anxiety - on Ativan RTC   -c/w ativan PRN                                                                              ----------------------------------------------------  # DVT prophylaxis SCDs    # GI prophylaxis protonix    # Diet Clear fluids    # Activity Score (AM-PAC)    # Code status     # Disposition                                                                              --------------------------------------------------------    Vasyl Carr   [facility]

## 2021-09-25 NOTE — PROGRESS NOTE ADULT - SUBJECTIVE AND OBJECTIVE BOX
GENERAL SURGERY PROGRESS NOTE    Patient: SIMRAN OJEDA , 50y (74)Female   MRN: [id number]  Location: [place] [address] A  Visit: 21 Inpatient  Date: 21 @ 01:20    Hospital Day #: 3    Procedure/Dx/Injuries: Fever, Abdominal pain, edematous gallbladder (5mm GB wall)    Events of past 24 hours: HIDA done    PAST MEDICAL & SURGICAL HISTORY:      Vitals:   T(F): 99.6 (21 @ 21:00), Max: 101.9 (21 @ 08:50)  HR: 94 (21 @ 21:00)  BP: 115/58 (21 @ 21:00)  RR: 17 (21 @ 21:00)  SpO2: 100% (21 @ 15:31)      Diet, NPO after Midnight:      NPO Start Date: 23-Sep-2021,   NPO Start Time: 23:59  Except Medications      Fluids:     I & O's:    Bowel Movement: : [] YES [] NO  Flatus: : [] YES [] NO    PHYSICAL EXAM:  General: NAD, AAOx3, calm and cooperative  Cardiac: RRR S1, S2  Respiratory: CTAB, normal respiratory effort  Abdomen: Soft, non-distended, non-tender, no rebound, no guarding. +BS.  Musculoskeletal: Strength 5/5 BL UE/LE, ROM intact, compartments soft  Skin: Warm/dry, normal color, no jaundice      MEDICATIONS  (STANDING):  chlorhexidine 4% Liquid 1 Application(s) Topical <User Schedule>  FLUoxetine 20 milliGRAM(s) Oral daily  folic acid 1 milliGRAM(s) Oral daily  LORazepam     Tablet 1.5 milliGRAM(s) Oral daily  pantoprazole    Tablet 40 milliGRAM(s) Oral before breakfast  piperacillin/tazobactam IVPB.. 3.375 Gram(s) IV Intermittent every 8 hours  sodium chloride 0.9%. 1000 milliLiter(s) (75 mL/Hr) IV Continuous <Continuous>  thiamine 100 milliGRAM(s) Oral daily    MEDICATIONS  (PRN):  ketorolac   Injectable 15 milliGRAM(s) IV Push every 8 hours PRN Moderate Pain (4 - 6)  LORazepam     Tablet 2 milliGRAM(s) Oral every 1 hour PRN CIWA-Ar score 8 or greater      DVT PROPHYLAXIS:   GI PROPHYLAXIS: pantoprazole    Tablet 40 milliGRAM(s) Oral before breakfast    ANTICOAGULATION:   ANTIBIOTICS:  piperacillin/tazobactam IVPB.. 3.375 Gram(s)            LAB/STUDIES:  Labs:  CAPILLARY BLOOD GLUCOSE                              9.7    7.62  )-----------( 36       ( 24 Sep 2021 06:31 )             29.3       Auto Neutrophil %: 12.0 % (21 @ 06:31)  Auto Immature Granulocyte %: 1.2 % (21 @ 06:31)        132<L>  |  101  |  13  ----------------------------<  98  3.7   |  19  |  0.8      Calcium, Total Serum: 8.0 mg/dL (21 @ 06:31)      LFTs:             6.4  | 0.9  | 91       ------------------[403     ( 24 Sep 2021 06:31 )  3.4  | x    | 96          Lipase:x      Amylase:x         Lactate, Blood: 1.0 mmol/L (21 @ 14:35)      Coags:     12.60  ----< 1.10    ( 24 Sep 2021 00:50 )     40.2              Alcohol, Blood: <10 mg/dL (21 @ 06:31)    Urinalysis Basic - ( 23 Sep 2021 15:55 )    Color: Zuleyma / Appearance: Turbid / S.010 / pH: x  Gluc: x / Ketone: Negative  / Bili: Negative / Urobili: <2 mg/dL   Blood: x / Protein: 30 mg/dL / Nitrite: Negative   Leuk Esterase: Small / RBC: >50 /HPF / WBC x   Sq Epi: x / Non Sq Epi: x / Bacteria: Few            Alcohol, Blood: <10 mg/dL (21 @ 06:31)      IMAGING:  < from: NM Hepatobiliary Imaging (21 @ 14:10) >  IMPRESSION:  HEPATOMEGALY  OTHER THAN HEPATOMEGALY, NORMAL HEPATOBILIARY IMAGES.   DEFINITE VISUALIZATION OF THE GALLBLADDER BY 5 MINUTES POST INJECTION, DEMONSTRATING PATENCY OF THE CYSTIC DUCT.    --- End of Report ---        < end of copied text >      ACCESS/ DEVICES:  [x ] Peripheral IV  [ ] Central Venous Line	[ ] R	[ ] L	[ ] IJ	[ ] Fem	[ ] SC	Placed:   [ ] Arterial Line		[ ] R	[ ] L	[ ] Fem	[ ] Rad	[ ] Ax	Placed:   [ ] PICC:					[ ] Mediport  [ ] Urinary Catheter,  Date Placed:   [ ] Chest tube: [ ] Right, [ ] Left  [ ] ESTEVAN/Nilson Drains

## 2021-09-25 NOTE — PROGRESS NOTE ADULT - ASSESSMENT
Ms. Ceci Baum is a 49 yo woman  who presented to the ED for 1 month history of abdominal pain and fever.        Abdominal pain and fever: R/O passed stone vs viral hepatitis  -CT abdomen U/S demonstrates no GS GB edema, eloise-cholecystic fluid with hepatomegaly, splenomegaly but HIDA neg  -LFTS trending down  CLD: HAV/HBV/HCV/EBVIgM neg  Recent H/O covid vaccine    Rec:  Anti HEV, Serum Ferritin, Transferrin Saturation, Ceruloplasmin level, EVARISTO, SMA, immunoglobulins panel, AMA, Anti LK microsomal Ab, anti-soluble liver Ag.  MRCP  Get EBV PCR, CMV PCR, VZV and HSV serology and PCR  advance diet as tolerated.        #Thrombocytopenia:  R/O myeloproliferative disorders, ITP vs others.  H/O clot disorder in family.  Recent H/O COVID vaccine but patient had some epistaxis earlier than that.  F/U with Hematology.

## 2021-09-25 NOTE — PROGRESS NOTE ADULT - SUBJECTIVE AND OBJECTIVE BOX
Patient is a 50y old  Female who presents with a chief complaint of Abd pain (25 Sep 2021 01:19)       Admitted on: 09-23-21    We are following the patient for abdominal pain and elevated liver enzymes     Interval History: Patient has minimal abdominal discomfort no vomiting or fever        PAST MEDICAL & SURGICAL HISTORY:      MEDICATIONS  (STANDING):  chlorhexidine 4% Liquid 1 Application(s) Topical <User Schedule>  FLUoxetine 20 milliGRAM(s) Oral daily  folic acid 1 milliGRAM(s) Oral daily  LORazepam     Tablet 1.5 milliGRAM(s) Oral daily  pantoprazole    Tablet 40 milliGRAM(s) Oral before breakfast  piperacillin/tazobactam IVPB.. 3.375 Gram(s) IV Intermittent every 8 hours  sodium chloride 0.9%. 1000 milliLiter(s) (75 mL/Hr) IV Continuous <Continuous>  thiamine 100 milliGRAM(s) Oral daily    MEDICATIONS  (PRN):  acetaminophen   Tablet .. 650 milliGRAM(s) Oral every 6 hours PRN Mild Pain (1 - 3), Moderate Pain (4 - 6)  ketorolac   Injectable 15 milliGRAM(s) IV Push every 8 hours PRN Moderate Pain (4 - 6)  LORazepam     Tablet 2 milliGRAM(s) Oral every 1 hour PRN CIWA-Ar score 8 or greater      Allergies  No Known Allergies      Review of Systems:   Cardiovascular:  No Chest Pain, No Palpitations  Respiratory:  No Cough, No Dyspnea  Gastrointestinal:  As described in HPI    Physical Examination:  T(C): 36.7 (09-25-21 @ 12:15), Max: 37.6 (09-24-21 @ 21:00)  HR: 88 (09-25-21 @ 12:15) (83 - 94)  BP: 108/69 (09-25-21 @ 12:15) (108/69 - 115/58)  RR: 18 (09-25-21 @ 12:15) (17 - 18)  SpO2: 98% (09-25-21 @ 05:00) (98% - 100%)      Constitutional: No acute distress.  Respiratory:  No signs of respiratory distress. Lung sounds are clear bilaterally.  Cardiovascular:  S1 S2, Regular rate and rhythm.  Abdominal: Abdomen is soft, symmetric, and non-tender without distention.   Skin: No rashes, No Jaundice.        Data: (reviewed by attending)                        9.3    9.57  )-----------( 40       ( 25 Sep 2021 07:54 )             27.8     Hgb trend:  9.3  09-25-21 @ 07:54  9.7  09-24-21 @ 06:31  9.6  09-23-21 @ 14:35      09-25    134<L>  |  104  |  11  ----------------------------<  117<H>  4.2   |  21  |  0.8    Ca    7.8<L>      25 Sep 2021 07:54  Mg     1.9     09-24    TPro  6.1  /  Alb  3.2<L>  /  TBili  0.6  /  DBili  x   /  AST  53<H>  /  ALT  62<H>  /  AlkPhos  298<H>  09-25    Liver panel trend:  TBili 0.6   /   AST 53   /   ALT 62   /   AlkP 298   /   Tptn 6.1   /   Alb 3.2    /   DBili --      09-25  TBili 0.9   /   AST 91   /   ALT 96   /   AlkP 403   /   Tptn 6.4   /   Alb 3.4    /   DBili --      09-24  TBili 1.1   /      /      /   AlkP 418   /   Tptn 6.4   /   Alb 3.4    /   DBili 0.6      09-23      PT/INR - ( 24 Sep 2021 00:50 )   PT: 12.60 sec;   INR: 1.10 ratio         PTT - ( 24 Sep 2021 00:50 )  PTT:40.2 sec       Radiology: (reviewed by attending)    US Abdomen Upper Quadrant Right:   EXAM:  US ABDOMEN RT UPR QUADRANT            PROCEDURE DATE:  09/23/2021            INTERPRETATION:  CLINICAL INFORMATION: Elevated liver function tests    COMPARISON: None available.    TECHNIQUE: Sonography of the right upper quadrant.      FINDINGS:    Liver: Hepatomegaly. No focal lesion.  Bile ducts: Normal caliber. Common bile duct measures 4 mm.  Gallbladder: Thickened and mildly edematous gallbladder wall to 0.5 cm. Surrounding pericholecystic fluid. No cholelithiasis. No reported sonographic Collins's sign..  Pancreas: Visualized portions are within normal limits.  Right kidney: 11.0 cm. No hydronephrosis.  Ascites: None.      IMPRESSION:    Diffusely thickened edematous gallbladder wall and surrounding pericholecystic fluid. Findings concerning for cholecystitis.    Hepatomegaly.    --- End of Report ---            MONI NASCIMENTO MD; Resident Radiologist  This document has been electronically signed.  ELLIOT LANDAU MD; Attending Radiologist  This document has been electronically signed. Sep 23 2021  3:56PM (09-23-21 @ 15:40)

## 2021-09-26 LAB
ALBUMIN SERPL ELPH-MCNC: 3.3 G/DL — LOW (ref 3.5–5.2)
ALP SERPL-CCNC: 255 U/L — HIGH (ref 30–115)
ALT FLD-CCNC: 47 U/L — HIGH (ref 0–41)
ANION GAP SERPL CALC-SCNC: 9 MMOL/L — SIGNIFICANT CHANGE UP (ref 7–14)
AST SERPL-CCNC: 42 U/L — HIGH (ref 0–41)
BASOPHILS # BLD AUTO: 0.02 K/UL — SIGNIFICANT CHANGE UP (ref 0–0.2)
BASOPHILS NFR BLD AUTO: 0.2 % — SIGNIFICANT CHANGE UP (ref 0–1)
BILIRUB SERPL-MCNC: 0.4 MG/DL — SIGNIFICANT CHANGE UP (ref 0.2–1.2)
BUN SERPL-MCNC: 7 MG/DL — LOW (ref 10–20)
CALCIUM SERPL-MCNC: 8 MG/DL — LOW (ref 8.5–10.1)
CHLORIDE SERPL-SCNC: 106 MMOL/L — SIGNIFICANT CHANGE UP (ref 98–110)
CO2 SERPL-SCNC: 23 MMOL/L — SIGNIFICANT CHANGE UP (ref 17–32)
CREAT SERPL-MCNC: 0.7 MG/DL — SIGNIFICANT CHANGE UP (ref 0.7–1.5)
EOSINOPHIL # BLD AUTO: 0 K/UL — SIGNIFICANT CHANGE UP (ref 0–0.7)
EOSINOPHIL NFR BLD AUTO: 0 % — SIGNIFICANT CHANGE UP (ref 0–8)
GLUCOSE SERPL-MCNC: 121 MG/DL — HIGH (ref 70–99)
HAPTOGLOB SERPL-MCNC: 86 MG/DL — SIGNIFICANT CHANGE UP (ref 34–200)
HCG UR QL: NEGATIVE — SIGNIFICANT CHANGE UP
HCT VFR BLD CALC: 26.8 % — LOW (ref 37–47)
HGB BLD-MCNC: 8.8 G/DL — LOW (ref 12–16)
IMM GRANULOCYTES NFR BLD AUTO: 0.7 % — HIGH (ref 0.1–0.3)
LYMPHOCYTES # BLD AUTO: 7.24 K/UL — HIGH (ref 1.2–3.4)
LYMPHOCYTES # BLD AUTO: 87.3 % — HIGH (ref 20.5–51.1)
MAGNESIUM SERPL-MCNC: 2.2 MG/DL — SIGNIFICANT CHANGE UP (ref 1.8–2.4)
MCHC RBC-ENTMCNC: 29 PG — SIGNIFICANT CHANGE UP (ref 27–31)
MCHC RBC-ENTMCNC: 32.8 G/DL — SIGNIFICANT CHANGE UP (ref 32–37)
MCV RBC AUTO: 88.4 FL — SIGNIFICANT CHANGE UP (ref 81–99)
MONOCYTES # BLD AUTO: 0.23 K/UL — SIGNIFICANT CHANGE UP (ref 0.1–0.6)
MONOCYTES NFR BLD AUTO: 2.8 % — SIGNIFICANT CHANGE UP (ref 1.7–9.3)
NEUTROPHILS # BLD AUTO: 0.74 K/UL — LOW (ref 1.4–6.5)
NEUTROPHILS NFR BLD AUTO: 9 % — LOW (ref 42.2–75.2)
NRBC # BLD: 0 /100 WBCS — SIGNIFICANT CHANGE UP (ref 0–0)
PLATELET # BLD AUTO: 59 K/UL — LOW (ref 130–400)
POTASSIUM SERPL-MCNC: 4.3 MMOL/L — SIGNIFICANT CHANGE UP (ref 3.5–5)
POTASSIUM SERPL-SCNC: 4.3 MMOL/L — SIGNIFICANT CHANGE UP (ref 3.5–5)
PROT SERPL-MCNC: 6.2 G/DL — SIGNIFICANT CHANGE UP (ref 6–8)
RBC # BLD: 3.03 M/UL — LOW (ref 4.2–5.4)
RBC # FLD: 16 % — HIGH (ref 11.5–14.5)
SMOOTH MUSCLE AB SER-ACNC: SIGNIFICANT CHANGE UP
SODIUM SERPL-SCNC: 138 MMOL/L — SIGNIFICANT CHANGE UP (ref 135–146)
WBC # BLD: 8.29 K/UL — SIGNIFICANT CHANGE UP (ref 4.8–10.8)
WBC # FLD AUTO: 8.29 K/UL — SIGNIFICANT CHANGE UP (ref 4.8–10.8)

## 2021-09-26 PROCEDURE — 71260 CT THORAX DX C+: CPT | Mod: 26

## 2021-09-26 PROCEDURE — 74177 CT ABD & PELVIS W/CONTRAST: CPT | Mod: 26

## 2021-09-26 PROCEDURE — 99232 SBSQ HOSP IP/OBS MODERATE 35: CPT

## 2021-09-26 PROCEDURE — 99233 SBSQ HOSP IP/OBS HIGH 50: CPT

## 2021-09-26 RX ADMIN — Medication 100 MILLIGRAM(S): at 11:41

## 2021-09-26 RX ADMIN — PIPERACILLIN AND TAZOBACTAM 25 GRAM(S): 4; .5 INJECTION, POWDER, LYOPHILIZED, FOR SOLUTION INTRAVENOUS at 06:15

## 2021-09-26 RX ADMIN — PIPERACILLIN AND TAZOBACTAM 25 GRAM(S): 4; .5 INJECTION, POWDER, LYOPHILIZED, FOR SOLUTION INTRAVENOUS at 21:10

## 2021-09-26 RX ADMIN — PIPERACILLIN AND TAZOBACTAM 25 GRAM(S): 4; .5 INJECTION, POWDER, LYOPHILIZED, FOR SOLUTION INTRAVENOUS at 13:22

## 2021-09-26 RX ADMIN — Medication 15 MILLIGRAM(S): at 08:17

## 2021-09-26 RX ADMIN — Medication 1 MILLIGRAM(S): at 11:41

## 2021-09-26 RX ADMIN — PANTOPRAZOLE SODIUM 40 MILLIGRAM(S): 20 TABLET, DELAYED RELEASE ORAL at 06:16

## 2021-09-26 RX ADMIN — Medication 15 MILLIGRAM(S): at 08:32

## 2021-09-26 RX ADMIN — Medication 20 MILLIGRAM(S): at 06:23

## 2021-09-26 RX ADMIN — Medication 1.5 MILLIGRAM(S): at 21:10

## 2021-09-26 NOTE — PROGRESS NOTE ADULT - ASSESSMENT
Plan:   Ms. Ceci Baum is a 51 yo woman  who presented to the ED for 1 month history of abdominal pain and fever.    # Abdominal pain and fever: R/O passed stone vs viral hepatitis  # transaminitis   - CT abdomen U/S demonstrates no GS GB edema, eloise-cholecystic fluid with hepatomegaly, splenomegaly but HIDA neg  - LFTS trending down  - CLD: HAV/HBV/HCV/EBV IgM neg  - follow Anti HEV, Serum Ferritin, Transferrin Saturation, Ceruloplasmin level, EVARISTO, SMA, immunoglobulins panel, AMA, Anti LK microsomal Ab, anti-soluble liver Ag, CMV PCR, VZV and HSV serology and PCR  - follow MRCP  - on clear liquid diet --> advance as tolerated    #Thrombocytopenia with hepatosplenomegaly   - r/o myeloproliferative disorders, ITP vs others.  - hepatitis panel negative, EBV antibodies - suggestive of past infection   - follow iron studies, % sat, TIBC, ferritin, reticulocyte count, haptoglobin, B12, folate, flow cytometry, ldh and DIC panel   - possible bone marrow biopsy if flow cytometry positive by hem/onc   - CT chest with iv contrast     # alcohol abuse: reports recent use with loss of family member, no signs of withdrawal on exam, catch team consult, patient counselled     # anxiety: continue prn ativan    Provider handoff: pending mrcp and thrombocytopenia work up.

## 2021-09-26 NOTE — PROGRESS NOTE ADULT - SUBJECTIVE AND OBJECTIVE BOX
Patient is a 50y old  Female who presents with a chief complaint of Abd pain (26 Sep 2021 11:25)      INTERVAL HPI/OVERNIGHT EVENTS: no acute overnight events noted.   Patient seen and examined at bedside.     REVIEW OF SYSTEMS: negative  Vital Signs Last 24 Hrs  T(C): 35.6 (26 Sep 2021 11:40), Max: 36.7 (25 Sep 2021 21:49)  T(F): 96 (26 Sep 2021 11:40), Max: 98 (25 Sep 2021 21:49)  HR: 91 (26 Sep 2021 11:40) (80 - 91)  BP: 122/51 (26 Sep 2021 11:40) (111/60 - 122/56)  BP(mean): --  RR: 18 (26 Sep 2021 11:40) (18 - 18)  SpO2: --    PHYSICAL EXAM:   NAD; Normocephalic;   LUNGS - no wheezing  HEART: S1 S2+   ABDOMEN: Soft, Nontender, non distended  EXTREMITIES: no cyanosis; no edema  NERVOUS SYSTEM:  Awake and alert; no focal neuro deficits appreciated    LABS:                        8.8    8.29  )-----------( 59       ( 26 Sep 2021 04:30 )             26.8     09-26    138  |  106  |  7<L>  ----------------------------<  121<H>  4.3   |  23  |  0.7    Ca    8.0<L>      26 Sep 2021 04:30  Mg     2.2     09-26    TPro  6.2  /  Alb  3.3<L>  /  TBili  0.4  /  DBili  x   /  AST  42<H>  /  ALT  47<H>  /  AlkPhos  255<H>  09-26        CAPILLARY BLOOD GLUCOSE          Medications:  MEDICATIONS  (STANDING):  chlorhexidine 4% Liquid 1 Application(s) Topical <User Schedule>  FLUoxetine 20 milliGRAM(s) Oral daily  folic acid 1 milliGRAM(s) Oral daily  LORazepam     Tablet 1.5 milliGRAM(s) Oral daily  melatonin 10 milliGRAM(s) Oral at bedtime  pantoprazole    Tablet 40 milliGRAM(s) Oral before breakfast  piperacillin/tazobactam IVPB.. 3.375 Gram(s) IV Intermittent every 8 hours  sodium chloride 0.9%. 1000 milliLiter(s) (75 mL/Hr) IV Continuous <Continuous>  thiamine 100 milliGRAM(s) Oral daily    MEDICATIONS  (PRN):  acetaminophen   Tablet .. 650 milliGRAM(s) Oral every 6 hours PRN Mild Pain (1 - 3), Moderate Pain (4 - 6)  ketorolac   Injectable 15 milliGRAM(s) IV Push every 8 hours PRN Moderate Pain (4 - 6)  LORazepam     Tablet 2 milliGRAM(s) Oral every 1 hour PRN CIWA-Ar score 8 or greater

## 2021-09-26 NOTE — PROGRESS NOTE ADULT - ATTENDING COMMENTS
Patient examined independently , above note read. Feels better , afebrile , LFTs improving . Spleen enlarged 4 fingers bcm , RUQ tenderness.   will follow .

## 2021-09-26 NOTE — PROGRESS NOTE ADULT - SUBJECTIVE AND OBJECTIVE BOX
Patient is a 50y old  Female who presents with a chief complaint of Abd pain (25 Sep 2021 13:12)      Subjective: The patient feels well today. She feels her abdominal bloating has improved. She has been afebrile since 9/24.       Vital Signs Last 24 Hrs  T(C): 36.4 (26 Sep 2021 05:00), Max: 36.7 (25 Sep 2021 12:15)  T(F): 97.5 (26 Sep 2021 05:00), Max: 98 (25 Sep 2021 12:15)  HR: 80 (26 Sep 2021 05:00) (80 - 88)  BP: 111/60 (26 Sep 2021 05:00) (108/69 - 122/56)  BP(mean): --  RR: 18 (26 Sep 2021 05:00) (18 - 18)  SpO2: --    PHYSICAL EXAM  General: adult in NAD  HEENT: PERRL  CV: normal S1/S2 with no murmur rubs or gallops  Lungs: CTABL  Abdomen: soft non-tender; palpable spleen   Ext: no edema  Neuro: alert and oriented X 4, no focal deficits    MEDICATIONS  (STANDING):  chlorhexidine 4% Liquid 1 Application(s) Topical <User Schedule>  FLUoxetine 20 milliGRAM(s) Oral daily  folic acid 1 milliGRAM(s) Oral daily  LORazepam     Tablet 1.5 milliGRAM(s) Oral daily  melatonin 10 milliGRAM(s) Oral at bedtime  pantoprazole    Tablet 40 milliGRAM(s) Oral before breakfast  piperacillin/tazobactam IVPB.. 3.375 Gram(s) IV Intermittent every 8 hours  sodium chloride 0.9%. 1000 milliLiter(s) (75 mL/Hr) IV Continuous <Continuous>  thiamine 100 milliGRAM(s) Oral daily    MEDICATIONS  (PRN):  acetaminophen   Tablet .. 650 milliGRAM(s) Oral every 6 hours PRN Mild Pain (1 - 3), Moderate Pain (4 - 6)  ketorolac   Injectable 15 milliGRAM(s) IV Push every 8 hours PRN Moderate Pain (4 - 6)  LORazepam     Tablet 2 milliGRAM(s) Oral every 1 hour PRN CIWA-Ar score 8 or greater      LABS:                          8.8    8.29  )-----------( 59       ( 26 Sep 2021 04:30 )             26.8         Mean Cell Volume : 88.4 fL  Mean Cell Hemoglobin : 29.0 pg  Mean Cell Hemoglobin Concentration : 32.8 g/dL  Auto Neutrophil # : 0.74 K/uL  Auto Lymphocyte # : 7.24 K/uL  Auto Monocyte # : 0.23 K/uL  Auto Eosinophil # : 0.00 K/uL  Auto Basophil # : 0.02 K/uL  Auto Neutrophil % : 9.0 %  Auto Lymphocyte % : 87.3 %  Auto Monocyte % : 2.8 %  Auto Eosinophil % : 0.0 %  Auto Basophil % : 0.2 %      Serial CBC's  09-26 @ 04:30  Hct-26.8 / Hgb-8.8 / Plat-59 / RBC-3.03 / WBC-8.29  Serial CBC's  09-25 @ 11:00  Hct--- / Hgb--- / Plat--- / RBC-3.27 / WBC---  Serial CBC's  09-25 @ 07:54  Hct-27.8 / Hgb-9.3 / Plat-40 / RBC-3.21 / WBC-9.57  Serial CBC's  09-24 @ 06:31  Hct-29.3 / Hgb-9.7 / Plat-36 / RBC-3.39 / WBC-7.62  Serial CBC's  09-23 @ 14:35  Hct-29.0 / Hgb-9.6 / Plat-36 / RBC-3.36 / WBC-8.03      09-26    138  |  106  |  7<L>  ----------------------------<  121<H>  4.3   |  23  |  0.7    Ca    8.0<L>      26 Sep 2021 04:30  Mg     2.2     09-26    TPro  6.2  /  Alb  3.3<L>  /  TBili  0.4  /  DBili  x   /  AST  42<H>  /  ALT  47<H>  /  AlkPhos  255<H>  09-26          Reticulocyte Percent: 0.9 % (09-25 @ 11:00)  Iron - Total Binding Capacity.: 301 ug/dL (09-25 @ 07:54)  Ferritin, Serum: 446 ng/mL (09-25 @ 07:54)                    Culture - Blood (collected 24 Sep 2021 04:30)  Source: .Blood Blood-Venous  Preliminary Report (25 Sep 2021 19:01):    No growth to date.            BLOOD SMEAR INTERPRETATION:       RADIOLOGY & ADDITIONAL STUDIES:

## 2021-09-26 NOTE — PROGRESS NOTE ADULT - ASSESSMENT
The patient is a 50 year old female with a PMH of IBS and anxiety, she is being admitted for fever and RUQ abdominal pain, suspected cholecystitis. Hematology evaluation requested for evaluation of thrombocytopenia and hepatosplenomegaly.    #Thrombocytopenia -acute (no previous records to compare)  #Anemia (likely Anemia of Chronic Disease)   -Could be secondary to underlying infectious/viral etiology +/-alcohol use; need to r/o hematological neoplastic  process in the setting of B symptoms (fever, night sweats) and hepatosplenomegaly  -Peripheral smear reviewed as above    #Hepatosplenomegaly  #?Cholecystitis with elevated LFTs -HIDA scan neg  -As per abdominal CT: IMPRESSION: Periportal edema and extensive gallbladder wall edema/thickening. Findings are nonspecific. Hepatosplenomegaly. Trace free pelvic ascites and right pleural effusion.   -As per RUQ US: IMPRESSION: Diffusely thickened edematous gallbladder wall and surrounding pericholecystic fluid. Findings concerning for cholecystitis. Hepatomegaly  -As per HIDA Scan: HEPATOMEGALY; OTHER THAN HEPATOMEGALY, NORMAL HEPATOBILIARY IMAGES.   DEFINITE VISUALIZATION OF THE GALLBLADDER BY 5 MINUTES POST INJECTION, DEMONSTRATING PATENCY OF THE CYSTIC DUCT.  -r/o hematological neoplastic  process in the setting of B symptoms (fever, night sweats)     PLAN:    -Viral  hepatitis panel pending; EBV serology indicative of past infection. Send for HIV   -Iron studies noted, Ferritin is elevated to 446 pointing towards ACD  - Retic and Hapto WBL; LDH elevated 365; Kemi negative   -SPEP and Immunoglobulin levels pending   -Flow cytometry results are pending   -Send LDH level as well   -DIC panel unremarkable   -Follow up with ID and GI    Awaiting results of flow cytometry and CT chest with IV contrast to determine further work up. If flow cytometry shows any suspicion for underlying hematological condition, will consider bone marrow biopsy. Patient is aware.     Will follow

## 2021-09-27 LAB
ALBUMIN SERPL ELPH-MCNC: 3.6 G/DL — SIGNIFICANT CHANGE UP (ref 3.5–5.2)
ALP SERPL-CCNC: 234 U/L — HIGH (ref 30–115)
ALT FLD-CCNC: 41 U/L — SIGNIFICANT CHANGE UP (ref 0–41)
ANION GAP SERPL CALC-SCNC: 10 MMOL/L — SIGNIFICANT CHANGE UP (ref 7–14)
AST SERPL-CCNC: 36 U/L — SIGNIFICANT CHANGE UP (ref 0–41)
BASOPHILS # BLD AUTO: 0.02 K/UL — SIGNIFICANT CHANGE UP (ref 0–0.2)
BASOPHILS NFR BLD AUTO: 0.3 % — SIGNIFICANT CHANGE UP (ref 0–1)
BILIRUB SERPL-MCNC: 0.5 MG/DL — SIGNIFICANT CHANGE UP (ref 0.2–1.2)
BUN SERPL-MCNC: 5 MG/DL — LOW (ref 10–20)
CALCIUM SERPL-MCNC: 8.3 MG/DL — LOW (ref 8.5–10.1)
CHLORIDE SERPL-SCNC: 103 MMOL/L — SIGNIFICANT CHANGE UP (ref 98–110)
CMV DNA CSF QL NAA+PROBE: SIGNIFICANT CHANGE UP
CO2 SERPL-SCNC: 22 MMOL/L — SIGNIFICANT CHANGE UP (ref 17–32)
CREAT SERPL-MCNC: 0.8 MG/DL — SIGNIFICANT CHANGE UP (ref 0.7–1.5)
EOSINOPHIL # BLD AUTO: 0.01 K/UL — SIGNIFICANT CHANGE UP (ref 0–0.7)
EOSINOPHIL NFR BLD AUTO: 0.1 % — SIGNIFICANT CHANGE UP (ref 0–8)
FOLATE SERPL-MCNC: 12.6 NG/ML — SIGNIFICANT CHANGE UP
GLUCOSE SERPL-MCNC: 123 MG/DL — HIGH (ref 70–99)
HCT VFR BLD CALC: 29.1 % — LOW (ref 37–47)
HCV RNA FLD QL NAA+PROBE: SIGNIFICANT CHANGE UP
HGB BLD-MCNC: 9.4 G/DL — LOW (ref 12–16)
HSV1 IGG SER-ACNC: 42.1 INDEX — HIGH
HSV1 IGG SERPL QL IA: POSITIVE
HSV2 IGG FLD-ACNC: 0.05 INDEX — SIGNIFICANT CHANGE UP
HSV2 IGG SERPL QL IA: NEGATIVE — SIGNIFICANT CHANGE UP
IGA FLD-MCNC: 468 MG/DL — SIGNIFICANT CHANGE UP (ref 84–499)
IGG FLD-MCNC: 1271 MG/DL — SIGNIFICANT CHANGE UP (ref 610–1660)
IGM SERPL-MCNC: 100 MG/DL — SIGNIFICANT CHANGE UP (ref 35–242)
IMM GRANULOCYTES NFR BLD AUTO: 0.6 % — HIGH (ref 0.1–0.3)
KAPPA LC SER QL IFE: 3.44 MG/DL — HIGH (ref 0.33–1.94)
KAPPA/LAMBDA FREE LIGHT CHAIN RATIO, SERUM: 1.04 RATIO — SIGNIFICANT CHANGE UP (ref 0.26–1.65)
LAMBDA LC SER QL IFE: 3.31 MG/DL — HIGH (ref 0.57–2.63)
LDH SERPL L TO P-CCNC: 312 — HIGH (ref 50–242)
LYMPHOCYTES # BLD AUTO: 6.64 K/UL — HIGH (ref 1.2–3.4)
LYMPHOCYTES # BLD AUTO: 84.5 % — HIGH (ref 20.5–51.1)
MAGNESIUM SERPL-MCNC: 2.2 MG/DL — SIGNIFICANT CHANGE UP (ref 1.8–2.4)
MCHC RBC-ENTMCNC: 28.5 PG — SIGNIFICANT CHANGE UP (ref 27–31)
MCHC RBC-ENTMCNC: 32.3 G/DL — SIGNIFICANT CHANGE UP (ref 32–37)
MCV RBC AUTO: 88.2 FL — SIGNIFICANT CHANGE UP (ref 81–99)
MONOCYTES # BLD AUTO: 0.25 K/UL — SIGNIFICANT CHANGE UP (ref 0.1–0.6)
MONOCYTES NFR BLD AUTO: 3.2 % — SIGNIFICANT CHANGE UP (ref 1.7–9.3)
NEUTROPHILS # BLD AUTO: 0.89 K/UL — LOW (ref 1.4–6.5)
NEUTROPHILS NFR BLD AUTO: 11.3 % — LOW (ref 42.2–75.2)
NRBC # BLD: 0 /100 WBCS — SIGNIFICANT CHANGE UP (ref 0–0)
PLATELET # BLD AUTO: 87 K/UL — LOW (ref 130–400)
POTASSIUM SERPL-MCNC: 4.2 MMOL/L — SIGNIFICANT CHANGE UP (ref 3.5–5)
POTASSIUM SERPL-SCNC: 4.2 MMOL/L — SIGNIFICANT CHANGE UP (ref 3.5–5)
PROT SERPL-MCNC: 6.2 G/DL — SIGNIFICANT CHANGE UP (ref 6–8.3)
PROT SERPL-MCNC: 6.2 G/DL — SIGNIFICANT CHANGE UP (ref 6–8.3)
PROT SERPL-MCNC: 6.7 G/DL — SIGNIFICANT CHANGE UP (ref 6–8)
RBC # BLD: 3.3 M/UL — LOW (ref 4.2–5.4)
RBC # FLD: 15.9 % — HIGH (ref 11.5–14.5)
SODIUM SERPL-SCNC: 135 MMOL/L — SIGNIFICANT CHANGE UP (ref 135–146)
VIT B12 SERPL-MCNC: 362 PG/ML — SIGNIFICANT CHANGE UP (ref 232–1245)
VZV IGG FLD QL IA: 881 INDEX — SIGNIFICANT CHANGE UP
VZV IGG FLD QL IA: POSITIVE — SIGNIFICANT CHANGE UP
WBC # BLD: 7.86 K/UL — SIGNIFICANT CHANGE UP (ref 4.8–10.8)
WBC # FLD AUTO: 7.86 K/UL — SIGNIFICANT CHANGE UP (ref 4.8–10.8)

## 2021-09-27 PROCEDURE — 99233 SBSQ HOSP IP/OBS HIGH 50: CPT

## 2021-09-27 PROCEDURE — 99232 SBSQ HOSP IP/OBS MODERATE 35: CPT

## 2021-09-27 RX ADMIN — Medication 1.5 MILLIGRAM(S): at 21:18

## 2021-09-27 RX ADMIN — Medication 100 MILLIGRAM(S): at 11:37

## 2021-09-27 RX ADMIN — Medication 10 MILLIGRAM(S): at 21:19

## 2021-09-27 RX ADMIN — PIPERACILLIN AND TAZOBACTAM 25 GRAM(S): 4; .5 INJECTION, POWDER, LYOPHILIZED, FOR SOLUTION INTRAVENOUS at 05:25

## 2021-09-27 RX ADMIN — Medication 1 MILLIGRAM(S): at 11:37

## 2021-09-27 RX ADMIN — PANTOPRAZOLE SODIUM 40 MILLIGRAM(S): 20 TABLET, DELAYED RELEASE ORAL at 05:26

## 2021-09-27 RX ADMIN — Medication 20 MILLIGRAM(S): at 05:26

## 2021-09-27 NOTE — PROGRESS NOTE ADULT - SUBJECTIVE AND OBJECTIVE BOX
Gastroenterology progress note:     Patient is a 50y old  Female who presents with a chief complaint of Abd pain (27 Sep 2021 11:40)       Admitted on: 09-23-21    We are following the patient for elevated LFT     Interval History:  feeling better denies any abdominal pain, nausea, vomiting.        PAST MEDICAL & SURGICAL HISTORY:      MEDICATIONS  (STANDING):  chlorhexidine 4% Liquid 1 Application(s) Topical <User Schedule>  FLUoxetine 20 milliGRAM(s) Oral daily  folic acid 1 milliGRAM(s) Oral daily  LORazepam     Tablet 1.5 milliGRAM(s) Oral daily  melatonin 10 milliGRAM(s) Oral at bedtime  pantoprazole    Tablet 40 milliGRAM(s) Oral before breakfast  thiamine 100 milliGRAM(s) Oral daily    MEDICATIONS  (PRN):  acetaminophen   Tablet .. 650 milliGRAM(s) Oral every 6 hours PRN Mild Pain (1 - 3), Moderate Pain (4 - 6)  LORazepam     Tablet 2 milliGRAM(s) Oral every 1 hour PRN CIWA-Ar score 8 or greater      Allergies  No Known Allergies      Review of Systems:   Cardiovascular:  No Chest Pain, No Palpitations  Respiratory:  No Cough, No Dyspnea  Gastrointestinal:  As described in HPI    Physical Examination:  T(C): 36 (09-27-21 @ 05:13), Max: 36.5 (09-26-21 @ 22:12)  HR: 74 (09-27-21 @ 05:13) (74 - 80)  BP: 108/56 (09-27-21 @ 05:13) (108/56 - 114/62)  RR: 18 (09-27-21 @ 05:13) (18 - 18)  SpO2: --      Constitutional: No acute distress.  Respiratory:  No signs of respiratory distress. Lung sounds are clear bilaterally.  Cardiovascular:  S1 S2, Regular rate and rhythm.  Abdominal: Abdomen is soft, symmetric, and non-tender without distention.   Skin: No rashes, No Jaundice.        Data:                        9.4    7.86  )-----------( 87       ( 27 Sep 2021 04:30 )             29.1     Hgb trend:  9.4  09-27-21 @ 04:30  8.8  09-26-21 @ 04:30  9.3  09-25-21 @ 07:54      09-27    135  |  103  |  5<L>  ----------------------------<  123<H>  4.2   |  22  |  0.8    Ca    8.3<L>      27 Sep 2021 04:30  Mg     2.2     09-27    TPro  6.7  /  Alb  3.6  /  TBili  0.5  /  DBili  x   /  AST  36  /  ALT  41  /  AlkPhos  234<H>  09-27    Liver panel trend:  TBili 0.5   /   AST 36   /   ALT 41   /   AlkP 234   /   Tptn 6.7   /   Alb 3.6    /   DBili --      09-27  TBili 0.4   /   AST 42   /   ALT 47   /   AlkP 255   /   Tptn 6.2   /   Alb 3.3    /   DBili --      09-26  TBili --   /   AST --   /   ALT --   /   AlkP --   /   Tptn 6.2   /   Alb --    /   DBili --      09-25  TBili 0.6   /   AST 53   /   ALT 62   /   AlkP 298   /   Tptn 6.1   /   Alb 3.2    /   DBili --      09-25  TBili 0.9   /   AST 91   /   ALT 96   /   AlkP 403   /   Tptn 6.4   /   Alb 3.4    /   DBili --      09-24  TBili 1.1   /      /      /   AlkP 418   /   Tptn 6.4   /   Alb 3.4    /   DBili 0.6      09-23             Radiology:  CT Abdomen and Pelvis w/ IV Cont:   EXAM:  CT ABDOMEN AND PELVIS IC        EXAM:  CT CHEST IC            PROCEDURE DATE:  09/26/2021            INTERPRETATION:  CLINICAL STATEMENT: Hepatosplenomegaly    TECHNIQUE: Contiguous axial CT images were obtained from the thoracic inlet to the pubic symphysis following administration of 100c Optiray 320 intravenous contrast.  Oral contrast was not administered.  Reformatted images in the coronal and sagittal planes were acquired.    COMPARISON CT: CT abdomen and pelvis September 23, 2021    OTHER STUDIES USED FOR CORRELATION: None.      FINDINGS:    CHEST:    LUNGS/PLEURA/AIRWAYS: Trachea and endobronchial tree are patent. Small bilateral pleural effusions. No consolidation or pneumothorax. No architectural distortion, honeycombing orbronchiectasis. Right middle lobe nodules measuring up to 0.3 cm (series 5 image 175). Several of the right middle lobe nodules are within the terminal right middle lobe airways.    MEDIASTINUM/THORACIC NODES: No enlarged mediastinal or hilar lymph nodes. Nonspecific bilateral axillary lymphadenopathy.    HEART/GREAT VESSELS: No pericardial effusion. Thoracic aorta and main pulmonary artery are normal in caliber.      ABDOMEN/PELVIS:    HEPATOBILIARY: Persistent distended gallbladder with slightly decreased gallbladder wall edema. Stable hepatomegaly and periportal edema.    SPLEEN: Stable splenomegaly measuring 17.4 cm.    PANCREAS: Unremarkable.    ADRENAL GLANDS: Unremarkable.    KIDNEYS: Symmetric renal enhancement without hydronephrosis bilaterally.    ABDOMINOPELVIC NODES: Unremarkable.    PELVIC ORGANS: Unremarkable.    PERITONEUM/MESENTERY/BOWEL: Small volume abdominopelvic ascites, unchanged. No bowel obstruction or pneumoperitoneum.    BONES/SOFT TISSUES: Unremarkable.      IMPRESSION:    Chest  1. Small bilateral pleural effusions.  2. Right middle lobe nodules including several subcentimeter nodules within the terminal airways.  3. Nonspecific bilateral axillary lymphadenopathy.    Abdomen and pelvis  1. Unchanged hepatosplenomegaly and periportal edema.  2. Persistent distended gallbladder with slightly decreased gallbladder wall edema.  3. Small volume abdominopelvic ascites, unchanged.    --- End of Report ---              ELLIOT LANDAU MD; Attending Radiologist  This document has been electronically signed. Sep 26 2021  7:29PM (09-26-21 @ 15:28)

## 2021-09-27 NOTE — PROGRESS NOTE ADULT - ATTENDING COMMENTS
had discussion with pt and her partner   seen examined w/ hemonc team; note reviewed, case discussed    1. pleaese obtain OLD RECORDS of CBC from PMD office to evaluate the trend of hg and plats and to state if cytopenia(s) are new or not  2. flow cytometry reslut is not available yet; hg is stable at 9.4g and plats are low but improving: doubt that flow would provide an answer; suggest to follow closely CBC. pt has unexplained hepatosplenomegaly , which could lead to cytopenias; if hepatosplenomegaly does not resolve and cytopenias do not improve, recommend bone marrow biopsy as outpatient

## 2021-09-27 NOTE — PROGRESS NOTE ADULT - ASSESSMENT
49 yo woman  who presented to the ED for 1 month history of abdominal pain and fever.    #)Abdominal pain and subjective fever: R/O passed stone vs viral hepatitis  -CT abdomen U/S demonstrates no GS GB edema, eloise-cholecystic fluid with hepatomegaly, splenomegaly but HIDA neg  -LFTS trending down  CLD: HAV/HBV/HCV neg, ASMA, LKM negative, EBV past infection, IgA, IgM normal   Recent H/O covid vaccine first dose 2 weeks ago    Rec:  will Follow up rest of the CLD work up  MRCP pending  advance diet as tolerated.    #)Anemia (normocytic anemia)Thrombocytopenia, hepatosplenomegaly less likely alcoholic liver disease (occasional wine consumption, no evidence of cirrhosis radiologically   -R/O myeloproliferative disorders, ITP vs others.  -H/O clot disorder in family.  -Recent H/O COVID vaccine but patient had some epistaxis earlier than that.  -Hematology notes appreciated-pending flow cytometry; Retic and Hapto WNL; LDH elevated 365; Kemi negative   -SPEP and Immunoglobulin levels pending

## 2021-09-27 NOTE — PROGRESS NOTE ADULT - ASSESSMENT
The patient is a 50 year old female with a PMH of IBS and anxiety, she is being admitted for fever and RUQ abdominal pain, suspected cholecystitis. Hematology evaluation requested for evaluation of thrombocytopenia and hepatosplenomegaly.    #Thrombocytopenia -acute (no previous records to compare)  #Anemia (likely Anemia of Chronic Disease)   -Could be secondary to underlying infectious/viral etiology +/-alcohol use; need to r/o hematological neoplastic  process in the setting of B symptoms (fever, night sweats) and hepatosplenomegaly  -Peripheral smear reviewed as above    #Hepatosplenomegaly  #?Cholecystitis with elevated LFTs -HIDA scan neg  -As per abdominal CT: IMPRESSION: Periportal edema and extensive gallbladder wall edema/thickening. Findings are nonspecific. Hepatosplenomegaly. Trace free pelvic ascites and right pleural effusion.   -As per RUQ US: IMPRESSION: Diffusely thickened edematous gallbladder wall and surrounding pericholecystic fluid. Findings concerning for cholecystitis. Hepatomegaly  -As per HIDA Scan: HEPATOMEGALY; OTHER THAN HEPATOMEGALY, NORMAL HEPATOBILIARY IMAGES.   DEFINITE VISUALIZATION OF THE GALLBLADDER BY 5 MINUTES POST INJECTION, DEMONSTRATING PATENCY OF THE CYSTIC DUCT.  -r/o hematological neoplastic  process in the setting of B symptoms (fever, night sweats)     PLAN:    -Viral  hepatitis panel pending; EBV serology indicative of past infection. Send for HIV   -Iron studies noted, Ferritin is elevated to 446 pointing towards ACD  -Retic and Hapto WNL; LDH elevated 365; Kemi negative   -SPEP and Immunoglobulin levels pending   -Flow cytometry results are pending   -MRCP pending  -CT Chest shows small b/l pleural effusions, b/l axillary nonspecific lymphadenopathy and right middle lobe subcentimeter nodules.   -DIC panel unremarkable   -Follow up with ID and GI    Awaiting results of flow cytometry  to determine further work up. If flow cytometry shows any suspicion for underlying hematological condition, will consider bone marrow biopsy. Patient is aware.     Will follow    The patient is a 50 year old female with a PMH of IBS and anxiety, she is being admitted for fever and RUQ abdominal pain, suspected cholecystitis. Hematology evaluation requested for evaluation of thrombocytopenia and hepatosplenomegaly.    #Thrombocytopenia -acute (no previous records to compare)  #Anemia (likely Anemia of Chronic Disease)   -Could be secondary to underlying infectious/viral etiology +/-alcohol use; need to r/o hematological neoplastic  process in the setting of B symptoms (fever, night sweats) and hepatosplenomegaly  -Peripheral smear reviewed as above    #Hepatosplenomegaly  #?Cholecystitis with elevated LFTs -HIDA scan neg  -As per abdominal CT: IMPRESSION: Periportal edema and extensive gallbladder wall edema/thickening. Findings are nonspecific. Hepatosplenomegaly. Trace free pelvic ascites and right pleural effusion.   -As per RUQ US: IMPRESSION: Diffusely thickened edematous gallbladder wall and surrounding pericholecystic fluid. Findings concerning for cholecystitis. Hepatomegaly  -As per HIDA Scan: HEPATOMEGALY; OTHER THAN HEPATOMEGALY, NORMAL HEPATOBILIARY IMAGES.   DEFINITE VISUALIZATION OF THE GALLBLADDER BY 5 MINUTES POST INJECTION, DEMONSTRATING PATENCY OF THE CYSTIC DUCT.  -r/o hematological neoplastic  process in the setting of B symptoms (fever, night sweats)     PLAN:    -Viral  hepatitis panel pending; EBV serology indicative of past infection. Send for HIV   -Iron studies noted, Ferritin is elevated to 446 pointing towards ACD  -Retic and Hapto WNL; LDH elevated 365; Kemi negative   -SPEP and Immunoglobulin levels pending   -Flow cytometry results are pending   -MRCP pending  -CT Chest shows small b/l pleural effusions, b/l axillary nonspecific lymphadenopathy and right middle lobe subcentimeter nodules.   -DIC panel unremarkable   -Will get records from pt's PCP about previous CBCs.   -Follow up with ID and GI    Awaiting results of flow cytometry  to determine further work up. If flow cytometry shows any suspicion for underlying hematological condition, will consider bone marrow biopsy. Pt does not have to stay inpatient for the flow cytometry results if medically cleared for discharge. We can follow up results outpatient, and pt will have a follow up with Dr Castanon to discuss results of flow cytometry, repeat CBC and potential bone marrow biopsy.        The patient is a 50 year old female with a PMH of IBS and anxiety, she is being admitted for fever and RUQ abdominal pain, suspected cholecystitis. Hematology evaluation requested for evaluation of thrombocytopenia and hepatosplenomegaly.    #Thrombocytopenia -no previous records to compare: can not state acute, subacute or chronic  #Anemia (likely Anemia of Chronic Disease)   -Could be secondary to underlying infectious/viral etiology +/-alcohol use; need to r/o hematological neoplastic  process in the setting of B symptoms (fever, night sweats) and hepatosplenomegaly  -Peripheral smear reviewed as above    #Hepatosplenomegaly  #?Cholecystitis with elevated LFTs -HIDA scan neg  -As per abdominal CT: IMPRESSION: Periportal edema and extensive gallbladder wall edema/thickening. Findings are nonspecific. Hepatosplenomegaly. Trace free pelvic ascites and right pleural effusion.   -As per RUQ US: IMPRESSION: Diffusely thickened edematous gallbladder wall and surrounding pericholecystic fluid. Findings concerning for cholecystitis. Hepatomegaly  -As per HIDA Scan: HEPATOMEGALY; OTHER THAN HEPATOMEGALY, NORMAL HEPATOBILIARY IMAGES.   DEFINITE VISUALIZATION OF THE GALLBLADDER BY 5 MINUTES POST INJECTION, DEMONSTRATING PATENCY OF THE CYSTIC DUCT.  -r/o hematological neoplastic  process in the setting of B symptoms (fever, night sweats)     PLAN:    -Viral  hepatitis panel pending; EBV serology indicative of past infection. Send for HIV   -Iron studies noted, Ferritin is elevated to 446 pointing towards ACD  -Retic and Hapto WNL; LDH elevated 365; Kemi negative   -SPEP and Immunoglobulin levels pending   -Flow cytometry results are pending   -MRCP pending  -CT Chest shows small b/l pleural effusions, b/l axillary nonspecific lymphadenopathy and right middle lobe subcentimeter nodules.   -DIC panel unremarkable   -Will get records from pt's PCP about previous CBCs.   -Follow up with ID and GI    Awaiting results of flow cytometry  to determine further work up. If flow cytometry shows any suspicion for underlying hematological condition, will consider bone marrow biopsy. Pt does not have to stay inpatient for the flow cytometry results if medically cleared for discharge. We can follow up results outpatient, and pt will have a follow up with Dr Castanon to discuss results of flow cytometry, repeat CBC and potential bone marrow biopsy.

## 2021-09-27 NOTE — PROGRESS NOTE ADULT - ATTENDING COMMENTS
Patient seen and examined at bedside   no acute overnight events noted.     Plan:   Ms. Ceci Baum is a 51 yo woman  who presented to the ED for 1 month history of abdominal pain and fever.    # Abdominal pain and fever: R/O passed stone vs viral hepatitis  # transaminitis   - CT abdomen U/S demonstrates no GS GB edema, eloise-cholecystic fluid with hepatomegaly, splenomegaly but HIDA neg  - LFTS trending down  - CLD: HAV/HBV/HCV/EBV IgM neg  - follow Anti HEV, Serum Ferritin, Transferrin Saturation, Ceruloplasmin level, EVARISTO, SMA, immunoglobulins panel, AMA, Anti LK microsomal Ab, anti-soluble liver Ag, CMV PCR, VZV and HSV serology and PCR  - follow MRCP  - tolerating regular diet     #Thrombocytopenia with hepatosplenomegaly   - r/o myeloproliferative disorders, ITP vs others.  - hepatitis panel negative, EBV antibodies - suggestive of past infection   - follow flow cytometry --> will decide further work up based on results   - possible bone marrow biopsy if flow cytometry positive by hem/onc   - CT chest - Small bilateral pleural effusions. Right middle lobe nodules including several subcentimeter nodules within the terminal airways. Nonspecific bilateral axillary lymphadenopathy.  - CT Abdomen and pelvis - Unchanged hepatosplenomegaly and periportal edema. Persistent distended gallbladder with slightly decreased gallbladder wall edema. Small volume abdominopelvic ascites, unchanged.      # alcohol abuse: reports recent use with loss of family member, no signs of withdrawal on exam, catch team consult, patient counselled     # anxiety: continue prn ativan    Provider handoff: pending mrcp and thrombocytopenia work up.

## 2021-09-27 NOTE — PROGRESS NOTE ADULT - SUBJECTIVE AND OBJECTIVE BOX
Patient is a 50y old  Female who presents with a chief complaint of Abd pain (27 Sep 2021 06:52)      Subjective:  Pt feels much better. Pt anxiously waiting about the results of flow cytometry. Discussed the CT chest findings with the patient.     Vital Signs Last 24 Hrs  T(C): 36 (27 Sep 2021 05:13), Max: 36.5 (26 Sep 2021 22:12)  T(F): 96.8 (27 Sep 2021 05:13), Max: 97.7 (26 Sep 2021 22:12)  HR: 74 (27 Sep 2021 05:13) (74 - 80)  BP: 108/56 (27 Sep 2021 05:13) (108/56 - 114/62)  RR: 18 (27 Sep 2021 05:13) (18 - 18)    PHYSICAL EXAM  General: adult in NAD  HEENT: clear oropharynx, anicteric sclera, pink conjunctiva  Neck: supple  CV: normal S1/S2 with no murmur rubs or gallops  Lungs: positive air movement b/l ant lungs,clear to auscultation, no wheezes, no rales  Abdomen: soft non-tender non-distended, no hepatosplenomegaly  Ext: no clubbing cyanosis or edema  Skin: no rashes and no petechiae  Neuro: alert and oriented X 4, no focal deficits    MEDICATIONS  (STANDING):  chlorhexidine 4% Liquid 1 Application(s) Topical <User Schedule>  FLUoxetine 20 milliGRAM(s) Oral daily  folic acid 1 milliGRAM(s) Oral daily  LORazepam     Tablet 1.5 milliGRAM(s) Oral daily  melatonin 10 milliGRAM(s) Oral at bedtime  pantoprazole    Tablet 40 milliGRAM(s) Oral before breakfast  thiamine 100 milliGRAM(s) Oral daily    MEDICATIONS  (PRN):  acetaminophen   Tablet .. 650 milliGRAM(s) Oral every 6 hours PRN Mild Pain (1 - 3), Moderate Pain (4 - 6)  LORazepam     Tablet 2 milliGRAM(s) Oral every 1 hour PRN CIWA-Ar score 8 or greater      LABS:                          9.4    7.86  )-----------( 87       ( 27 Sep 2021 04:30 )             29.1         Mean Cell Volume : 88.2 fL  Mean Cell Hemoglobin : 28.5 pg  Mean Cell Hemoglobin Concentration : 32.3 g/dL  Auto Neutrophil # : 0.89 K/uL  Auto Lymphocyte # : 6.64 K/uL  Auto Monocyte # : 0.25 K/uL  Auto Eosinophil # : 0.01 K/uL  Auto Basophil # : 0.02 K/uL  Auto Neutrophil % : 11.3 %  Auto Lymphocyte % : 84.5 %  Auto Monocyte % : 3.2 %  Auto Eosinophil % : 0.1 %  Auto Basophil % : 0.3 %      Serial CBC's  09-27 @ 04:30  Hct-29.1 / Hgb-9.4 / Plat-87 / RBC-3.30 / WBC-7.86  Serial CBC's  09-26 @ 04:30  Hct-26.8 / Hgb-8.8 / Plat-59 / RBC-3.03 / WBC-8.29  Serial CBC's  09-25 @ 11:00  Hct--- / Hgb--- / Plat--- / RBC-3.27 / WBC---  Serial CBC's  09-25 @ 07:54  Hct-27.8 / Hgb-9.3 / Plat-40 / RBC-3.21 / WBC-9.57  Serial CBC's  09-24 @ 06:31  Hct-29.3 / Hgb-9.7 / Plat-36 / RBC-3.39 / WBC-7.62  Serial CBC's  09-23 @ 14:35  Hct-29.0 / Hgb-9.6 / Plat-36 / RBC-3.36 / WBC-8.03      09-27    135  |  103  |  5<L>  ----------------------------<  123<H>  4.2   |  22  |  0.8    Ca    8.3<L>      27 Sep 2021 04:30  Mg     2.2     09-27    TPro  6.7  /  Alb  3.6  /  TBili  0.5  /  DBili  x   /  AST  36  /  ALT  41  /  AlkPhos  234<H>  09-27      Folate, Serum: 12.6 ng/mL (09-25 @ 11:00)  Vitamin B12, Serum: 362 pg/mL (09-25 @ 11:00)  Reticulocyte Percent: 0.9 % (09-25 @ 11:00)  Iron - Total Binding Capacity.: 301 ug/dL (09-25 @ 07:54)  Ferritin, Serum: 446 ng/mL (09-25 @ 07:54)      RADIOLOGY & ADDITIONAL STUDIES:    < from: CT Chest w/ IV Cont (09.26.21 @ 15:28) >  Chest  1. Small bilateral pleural effusions.  2. Right middle lobe nodules including several subcentimeter nodules within the terminal airways.  3. Nonspecific bilateral axillary lymphadenopathy.    Abdomen and pelvis  1. Unchanged hepatosplenomegaly and periportal edema.  2. Persistent distended gallbladder with slightly decreased gallbladder wall edema.  3. Small volume abdominopelvic ascites, unchanged.

## 2021-09-27 NOTE — PROGRESS NOTE ADULT - TIME BILLING
For clinical care, patient education and care coordination.
For clinical care, patient education and care coordination.
Cholestatic liver tests with HSM cytopenia
For clinical care, patient education and care coordination.

## 2021-09-27 NOTE — PROGRESS NOTE ADULT - SUBJECTIVE AND OBJECTIVE BOX
CECI OJEDA 50y Female  MRN#: [id number]   CODE STATUS:________    Hospital Day: 4d    Pt is currently admitted with the primary diagnosis of     SUBJECTIVE  Hospital Course    Overnight events     Subjective complaints     Present Today:   - Pardo:  No [  ], Yes [   ] : Indication:     - Type of IV Access:       .. CVC/Piccline:  No [  ], Yes [   ] : Indication:       .. Midline: No [  ], Yes [   ] : Indication:                                             ----------------------------------------------------------  OBJECTIVE  PAST MEDICAL & SURGICAL HISTORY                                            -----------------------------------------------------------  ALLERGIES:  No Known Allergies                                            ------------------------------------------------------------    HOME MEDICATIONS  Home Medications:  Ativan: 1.5 milligram(s) orally once a day (24 Sep 2021 01:22)  PROzac 20 mg oral capsule: 1 cap(s) orally once a day (24 Sep 2021 01:22)                           MEDICATIONS:  STANDING MEDICATIONS  chlorhexidine 4% Liquid 1 Application(s) Topical <User Schedule>  FLUoxetine 20 milliGRAM(s) Oral daily  folic acid 1 milliGRAM(s) Oral daily  LORazepam     Tablet 1.5 milliGRAM(s) Oral daily  melatonin 10 milliGRAM(s) Oral at bedtime  pantoprazole    Tablet 40 milliGRAM(s) Oral before breakfast  piperacillin/tazobactam IVPB.. 3.375 Gram(s) IV Intermittent every 8 hours  sodium chloride 0.9%. 1000 milliLiter(s) IV Continuous <Continuous>  thiamine 100 milliGRAM(s) Oral daily    PRN MEDICATIONS  acetaminophen   Tablet .. 650 milliGRAM(s) Oral every 6 hours PRN  LORazepam     Tablet 2 milliGRAM(s) Oral every 1 hour PRN                                            ------------------------------------------------------------  VITAL SIGNS: Last 24 Hours  T(C): 36 (27 Sep 2021 05:13), Max: 36.5 (26 Sep 2021 22:12)  T(F): 96.8 (27 Sep 2021 05:13), Max: 97.7 (26 Sep 2021 22:12)  HR: 74 (27 Sep 2021 05:13) (74 - 91)  BP: 108/56 (27 Sep 2021 05:13) (108/56 - 122/51)  BP(mean): --  RR: 18 (27 Sep 2021 05:13) (18 - 18)  SpO2: --                                             --------------------------------------------------------------  LABS:                        8.8    8.29  )-----------( 59       ( 26 Sep 2021 04:30 )             26.8     09-26    138  |  106  |  7<L>  ----------------------------<  121<H>  4.3   |  23  |  0.7    Ca    8.0<L>      26 Sep 2021 04:30  Mg     2.2     09-26    TPro  6.2  /  Alb  3.3<L>  /  TBili  0.4  /  DBili  x   /  AST  42<H>  /  ALT  47<H>  /  AlkPhos  255<H>  09-26                                                              -------------------------------------------------------------  RADIOLOGY:                                            --------------------------------------------------------------    PHYSICAL EXAM:  General:   HEENT: NCAT  LUNGS: CTAB, Good air entry bilat  HEART: RRR, +S1,S2, RRR  ABDOMEN: SNTTP, ND x 4 q's  EXT: Warm, well perfused x 4  NEURO: AxOx3, No FND's noted  SKIN: No new breakdown or rashes noted                                           --------------------------------------------------------------    ASSESSMENT & PLAN    Ms. Ceci Ojeda is a 49 yo woman  who presented to the ED for 1 month history of abdominal pain and fever.    # Abdominal pain and fever: R/O passed stone vs viral hepatitis  # transaminitis   - CT abdomen U/S demonstrates no GS GB edema, eloise-cholecystic fluid with hepatomegaly, splenomegaly but HIDA neg  - LFTS trending down  - CLD: HAV/HBV/HCV/EBV IgM neg  - follow Anti HEV, Serum Ferritin, Transferrin Saturation, Ceruloplasmin level, EVARISTO, SMA, immunoglobulins panel, AMA, Anti LK microsomal Ab, anti-soluble liver Ag, CMV PCR, VZV and HSV serology and PCR  - follow MRCP  - on clear liquid diet --> advance as tolerated    #Thrombocytopenia with hepatosplenomegaly   - r/o myeloproliferative disorders, ITP vs others.  - hepatitis panel negative, EBV antibodies - suggestive of past infection   - follow iron studies, % sat, TIBC, ferritin, reticulocyte count, haptoglobin, B12, folate, flow cytometry, ldh and DIC panel   - possible bone marrow biopsy if flow cytometry positive by hem/onc   - CT chest with iv contrast     # alcohol abuse: reports recent use with loss of family member, no signs of withdrawal on exam, catch team consult, patient counselled     # anxiety: continue prn ativan    Provider handoff: pending mrcp and thrombocytopenia work up.                      CECI OJEDA 50y Female  MRN#: [id number]   CODE STATUS: FULL     Hospital Day: 4d    Pt is currently admitted with the primary diagnosis of Abdominal Pain     SUBJECTIVE      Overnight events: No overnight events     Subjective complaints: She denies fevers, weakness, SOB, chest pain, abdominal pain, cramping, N/V/D, LE swelling.     Present Today:   - Pardo:  No [  ], Yes [   ] : Indication:     - Type of IV Access:       .. CVC/Piccline:  No [  ], Yes [   ] : Indication:       .. Midline: No [  ], Yes [   ] : Indication:                                             ----------------------------------------------------------  OBJECTIVE  PAST MEDICAL & SURGICAL HISTORY                                            -----------------------------------------------------------  ALLERGIES:  No Known Allergies                                            ------------------------------------------------------------    HOME MEDICATIONS  Home Medications:  Ativan: 1.5 milligram(s) orally once a day (24 Sep 2021 01:22)  PROzac 20 mg oral capsule: 1 cap(s) orally once a day (24 Sep 2021 01:22)                           MEDICATIONS:  STANDING MEDICATIONS  chlorhexidine 4% Liquid 1 Application(s) Topical <User Schedule>  FLUoxetine 20 milliGRAM(s) Oral daily  folic acid 1 milliGRAM(s) Oral daily  LORazepam     Tablet 1.5 milliGRAM(s) Oral daily  melatonin 10 milliGRAM(s) Oral at bedtime  pantoprazole    Tablet 40 milliGRAM(s) Oral before breakfast  piperacillin/tazobactam IVPB.. 3.375 Gram(s) IV Intermittent every 8 hours  sodium chloride 0.9%. 1000 milliLiter(s) IV Continuous <Continuous>  thiamine 100 milliGRAM(s) Oral daily    PRN MEDICATIONS  acetaminophen   Tablet .. 650 milliGRAM(s) Oral every 6 hours PRN  LORazepam     Tablet 2 milliGRAM(s) Oral every 1 hour PRN                                            ------------------------------------------------------------  VITAL SIGNS: Last 24 Hours  T(C): 36 (27 Sep 2021 05:13), Max: 36.5 (26 Sep 2021 22:12)  T(F): 96.8 (27 Sep 2021 05:13), Max: 97.7 (26 Sep 2021 22:12)  HR: 74 (27 Sep 2021 05:13) (74 - 91)  BP: 108/56 (27 Sep 2021 05:13) (108/56 - 122/51)  BP(mean): --  RR: 18 (27 Sep 2021 05:13) (18 - 18)  SpO2: --                                             --------------------------------------------------------------  LABS:                        8.8    8.29  )-----------( 59       ( 26 Sep 2021 04:30 )             26.8     09-26    138  |  106  |  7<L>  ----------------------------<  121<H>  4.3   |  23  |  0.7    Ca    8.0<L>      26 Sep 2021 04:30  Mg     2.2     09-26    TPro  6.2  /  Alb  3.3<L>  /  TBili  0.4  /  DBili  x   /  AST  42<H>  /  ALT  47<H>  /  AlkPhos  255<H>  09-26                                              -------------------------------------------------------------  RADIOLOGY:  EXAM:  CT ABDOMEN AND PELVIS IC        EXAM:  CT CHEST IC            PROCEDURE DATE:  09/26/2021            INTERPRETATION:  CLINICAL STATEMENT: Hepatosplenomegaly    TECHNIQUE: Contiguous axial CT images were obtained from the thoracic inlet to the pubic symphysis following administration of 100c Optiray 320 intravenous contrast.  Oral contrast was not administered.  Reformatted images in the coronal and sagittal planes were acquired.    COMPARISON CT: CT abdomen and pelvis September 23, 2021    OTHER STUDIES USED FOR CORRELATION: None.      FINDINGS:    CHEST:    LUNGS/PLEURA/AIRWAYS: Trachea and endobronchial tree are patent. Small bilateral pleural effusions. No consolidation or pneumothorax. No architectural distortion, honeycombing orbronchiectasis. Right middle lobe nodules measuring up to 0.3 cm (series 5 image 175). Several of the right middle lobe nodules are within the terminal right middle lobe airways.    MEDIASTINUM/THORACIC NODES: No enlarged mediastinal or hilar lymph nodes. Nonspecific bilateral axillary lymphadenopathy.    HEART/GREAT VESSELS: No pericardial effusion. Thoracic aorta and main pulmonary artery are normal in caliber.      ABDOMEN/PELVIS:    HEPATOBILIARY: Persistent distended gallbladder with slightly decreased gallbladder wall edema. Stable hepatomegaly and periportal edema.    SPLEEN: Stable splenomegaly measuring 17.4 cm.    PANCREAS: Unremarkable.    ADRENAL GLANDS: Unremarkable.    KIDNEYS: Symmetric renal enhancement without hydronephrosis bilaterally.    ABDOMINOPELVIC NODES: Unremarkable.    PELVIC ORGANS: Unremarkable.    PERITONEUM/MESENTERY/BOWEL: Small volume abdominopelvic ascites, unchanged. No bowel obstruction or pneumoperitoneum.    BONES/SOFT TISSUES: Unremarkable.      IMPRESSION:    Chest  1. Small bilateral pleural effusions.  2. Right middle lobe nodules including several subcentimeter nodules within the terminal airways.  3. Nonspecific bilateral axillary lymphadenopathy.    Abdomen and pelvis  1. Unchanged hepatosplenomegaly and periportal edema.  2. Persistent distended gallbladder with slightly decreased gallbladder wall edema.  3. Small volume abdominopelvic ascites, unchanged.    --- End of Report ---              ELLIOT LANDAU MD; Attending Radiologist  This document has been electronically signed. Sep 26 2021  7:29PM                                            --------------------------------------------------------------    PHYSICAL EXAM:  GENERAL: NAD  EYES: conjunctiva and sclera clear  ENT: Moist mucous membranes  NECK: Supple, No JVD  CHEST/LUNG: Clear to auscultation bilaterally; No rales, rhonchi, wheezing, or rubs. Unlabored respirations  HEART: Regular rate and rhythm; No murmurs, rubs, or gallops  ABDOMEN: BSx4; Soft, mild-moderate epigastric and RUQ tenderness, Art -ve, nondistended  EXTREMITIES: No LE edema  NERVOUS SYSTEM:  A&Ox3                                         --------------------------------------------------------------    ASSESSMENT & PLAN    Ms. Ceci Ojeda is a 51 yo woman  who presented to the ED for 1 month history of abdominal pain and fever.    # Abdominal pain and fever: R/O passed stone vs viral hepatitis  # Transaminitis   - CT abdomen U/S demonstrates no GS GB edema, eloise-cholecystic fluid with hepatomegaly, splenomegaly but HIDA neg  - LFTS trending down  - CLD: HAV/HBV/HCV/EBV IgM neg  - Per Hemonc consult:   -Viral  hepatitis panel pending; EBV serology indicative of past infection. Send for HIV   -Iron studies noted, Ferritin is elevated to 446 pointing towards ACD  -Retic and Hapto WNL; LDH elevated 365; Kemi negative   -SPEP and Immunoglobulin levels pending   -Flow cytometry results are pending   -MRCP pending  -CT Chest shows small b/l pleural effusions, b/l axillary nonspecific lymphadenopathy and right middle lobe subcentimeter nodules.   -DIC panel unremarkable   -Possible Bone marrow biopsy pending workup   - Surgery: No surgical intervention given negative Hida scan  - GI: Appreciate recs       #Thrombocytopenia with hepatosplenomegaly   - r/o myeloproliferative disorders, ITP vs others.  - hepatitis panel negative, EBV antibodies - suggestive of past infection   - Per Hemonc consult:   - Could be secondary to underlying infectious/viral etiology +/-alcohol use; need to r/o hematological neoplastic  process in the setting of B symptoms (fever, night sweats) and hepatosplenomegaly  - possible bone marrow biopsy if flow cytometry positive by hem/onc       # alcohol abuse:   - reports recent use with loss of family member, no signs of withdrawal on exam, catch team consult, patient counselled     # anxiety:   - continue prn ativan    Provider handoff: pending mrcp, tolerating regular diet and further workup per hemonc

## 2021-09-27 NOTE — PROGRESS NOTE ADULT - ATTENDING COMMENTS
50 year old  female with acute febrile illness abdominal discomfort anemia thrombocytopenia cholestatic liver tests and CT abdomen showing hepatosplenomegaly.  Hx of symptoms for about 10 days.   Hx of COVID vaccine this month.  had shingles.  Patient feels better.  Platelet count improving. Hb stable. WBC normal  Clinical picture suggestive of possible acute viral illness considering multisystem involvement and improvement.  Passed CBD stones could explain the improved liver tests but not the entire picture.   Awaiting MRCP and results of hematological tests.

## 2021-09-28 ENCOUNTER — TRANSCRIPTION ENCOUNTER (OUTPATIENT)
Age: 51
End: 2021-09-28

## 2021-09-28 VITALS
HEART RATE: 80 BPM | SYSTOLIC BLOOD PRESSURE: 128 MMHG | DIASTOLIC BLOOD PRESSURE: 55 MMHG | TEMPERATURE: 97 F | RESPIRATION RATE: 16 BRPM

## 2021-09-28 LAB
ALBUMIN SERPL ELPH-MCNC: 3.2 G/DL — LOW (ref 3.5–5.2)
ALBUMIN SERPL ELPH-MCNC: 3.4 G/DL — LOW (ref 3.5–5.2)
ALP SERPL-CCNC: 198 U/L — HIGH (ref 30–115)
ALP SERPL-CCNC: 200 U/L — HIGH (ref 30–115)
ALT FLD-CCNC: 29 U/L — SIGNIFICANT CHANGE UP (ref 0–41)
ALT FLD-CCNC: 29 U/L — SIGNIFICANT CHANGE UP (ref 0–41)
ANION GAP SERPL CALC-SCNC: 8 MMOL/L — SIGNIFICANT CHANGE UP (ref 7–14)
ANION GAP SERPL CALC-SCNC: 9 MMOL/L — SIGNIFICANT CHANGE UP (ref 7–14)
AST SERPL-CCNC: 28 U/L — SIGNIFICANT CHANGE UP (ref 0–41)
AST SERPL-CCNC: 29 U/L — SIGNIFICANT CHANGE UP (ref 0–41)
BASOPHILS # BLD AUTO: 0.03 K/UL — SIGNIFICANT CHANGE UP (ref 0–0.2)
BASOPHILS # BLD AUTO: 0.04 K/UL — SIGNIFICANT CHANGE UP (ref 0–0.2)
BASOPHILS NFR BLD AUTO: 0.3 % — SIGNIFICANT CHANGE UP (ref 0–1)
BASOPHILS NFR BLD AUTO: 0.4 % — SIGNIFICANT CHANGE UP (ref 0–1)
BILIRUB SERPL-MCNC: 0.4 MG/DL — SIGNIFICANT CHANGE UP (ref 0.2–1.2)
BILIRUB SERPL-MCNC: 0.4 MG/DL — SIGNIFICANT CHANGE UP (ref 0.2–1.2)
BUN SERPL-MCNC: 7 MG/DL — LOW (ref 10–20)
BUN SERPL-MCNC: 8 MG/DL — LOW (ref 10–20)
CALCIUM SERPL-MCNC: 8.2 MG/DL — LOW (ref 8.5–10.1)
CALCIUM SERPL-MCNC: 8.4 MG/DL — LOW (ref 8.5–10.1)
CHLORIDE SERPL-SCNC: 104 MMOL/L — SIGNIFICANT CHANGE UP (ref 98–110)
CHLORIDE SERPL-SCNC: 106 MMOL/L — SIGNIFICANT CHANGE UP (ref 98–110)
CO2 SERPL-SCNC: 23 MMOL/L — SIGNIFICANT CHANGE UP (ref 17–32)
CO2 SERPL-SCNC: 25 MMOL/L — SIGNIFICANT CHANGE UP (ref 17–32)
CREAT SERPL-MCNC: 0.7 MG/DL — SIGNIFICANT CHANGE UP (ref 0.7–1.5)
CREAT SERPL-MCNC: 0.8 MG/DL — SIGNIFICANT CHANGE UP (ref 0.7–1.5)
EBV DNA SERPL NAA+PROBE-ACNC: SIGNIFICANT CHANGE UP IU/ML
EOSINOPHIL # BLD AUTO: 0.02 K/UL — SIGNIFICANT CHANGE UP (ref 0–0.7)
EOSINOPHIL # BLD AUTO: 0.02 K/UL — SIGNIFICANT CHANGE UP (ref 0–0.7)
EOSINOPHIL NFR BLD AUTO: 0.2 % — SIGNIFICANT CHANGE UP (ref 0–8)
EOSINOPHIL NFR BLD AUTO: 0.2 % — SIGNIFICANT CHANGE UP (ref 0–8)
GLUCOSE SERPL-MCNC: 103 MG/DL — HIGH (ref 70–99)
GLUCOSE SERPL-MCNC: 108 MG/DL — HIGH (ref 70–99)
HCT VFR BLD CALC: 25.1 % — LOW (ref 37–47)
HCT VFR BLD CALC: 30 % — LOW (ref 37–47)
HGB BLD-MCNC: 8.1 G/DL — LOW (ref 12–16)
HGB BLD-MCNC: 9.6 G/DL — LOW (ref 12–16)
IMM GRANULOCYTES NFR BLD AUTO: 0.5 % — HIGH (ref 0.1–0.3)
IMM GRANULOCYTES NFR BLD AUTO: 0.6 % — HIGH (ref 0.1–0.3)
LKM AB SER-ACNC: <20.1 UNITS — SIGNIFICANT CHANGE UP (ref 0–20)
LYMPHOCYTES # BLD AUTO: 7.41 K/UL — HIGH (ref 1.2–3.4)
LYMPHOCYTES # BLD AUTO: 84.5 % — HIGH (ref 20.5–51.1)
LYMPHOCYTES # BLD AUTO: 89.6 % — HIGH (ref 20.5–51.1)
LYMPHOCYTES # BLD AUTO: 9.83 K/UL — HIGH (ref 1.2–3.4)
MAGNESIUM SERPL-MCNC: 2.2 MG/DL — SIGNIFICANT CHANGE UP (ref 1.8–2.4)
MCHC RBC-ENTMCNC: 28.1 PG — SIGNIFICANT CHANGE UP (ref 27–31)
MCHC RBC-ENTMCNC: 28.4 PG — SIGNIFICANT CHANGE UP (ref 27–31)
MCHC RBC-ENTMCNC: 32 G/DL — SIGNIFICANT CHANGE UP (ref 32–37)
MCHC RBC-ENTMCNC: 32.3 G/DL — SIGNIFICANT CHANGE UP (ref 32–37)
MCV RBC AUTO: 87.7 FL — SIGNIFICANT CHANGE UP (ref 81–99)
MCV RBC AUTO: 88.1 FL — SIGNIFICANT CHANGE UP (ref 81–99)
MITOCHONDRIA AB SER-ACNC: SIGNIFICANT CHANGE UP
MONOCYTES # BLD AUTO: 0.28 K/UL — SIGNIFICANT CHANGE UP (ref 0.1–0.6)
MONOCYTES # BLD AUTO: 0.38 K/UL — SIGNIFICANT CHANGE UP (ref 0.1–0.6)
MONOCYTES NFR BLD AUTO: 3.2 % — SIGNIFICANT CHANGE UP (ref 1.7–9.3)
MONOCYTES NFR BLD AUTO: 3.5 % — SIGNIFICANT CHANGE UP (ref 1.7–9.3)
NEUTROPHILS # BLD AUTO: 0.65 K/UL — LOW (ref 1.4–6.5)
NEUTROPHILS # BLD AUTO: 0.98 K/UL — LOW (ref 1.4–6.5)
NEUTROPHILS NFR BLD AUTO: 11.2 % — LOW (ref 42.2–75.2)
NEUTROPHILS NFR BLD AUTO: 5.8 % — LOW (ref 42.2–75.2)
NRBC # BLD: 0 /100 WBCS — SIGNIFICANT CHANGE UP (ref 0–0)
NRBC # BLD: 0 /100 WBCS — SIGNIFICANT CHANGE UP (ref 0–0)
PLATELET # BLD AUTO: 133 K/UL — SIGNIFICANT CHANGE UP (ref 130–400)
PLATELET # BLD AUTO: 96 K/UL — LOW (ref 130–400)
POTASSIUM SERPL-MCNC: 3.9 MMOL/L — SIGNIFICANT CHANGE UP (ref 3.5–5)
POTASSIUM SERPL-MCNC: 4.3 MMOL/L — SIGNIFICANT CHANGE UP (ref 3.5–5)
POTASSIUM SERPL-SCNC: 3.9 MMOL/L — SIGNIFICANT CHANGE UP (ref 3.5–5)
POTASSIUM SERPL-SCNC: 4.3 MMOL/L — SIGNIFICANT CHANGE UP (ref 3.5–5)
PROT SERPL-MCNC: 5.9 G/DL — LOW (ref 6–8)
PROT SERPL-MCNC: 6.3 G/DL — SIGNIFICANT CHANGE UP (ref 6–8)
RBC # BLD: 2.85 M/UL — LOW (ref 4.2–5.4)
RBC # BLD: 3.42 M/UL — LOW (ref 4.2–5.4)
RBC # FLD: 15.5 % — HIGH (ref 11.5–14.5)
RBC # FLD: 15.6 % — HIGH (ref 11.5–14.5)
SMOOTH MUSCLE AB SER-ACNC: SIGNIFICANT CHANGE UP
SODIUM SERPL-SCNC: 137 MMOL/L — SIGNIFICANT CHANGE UP (ref 135–146)
SODIUM SERPL-SCNC: 138 MMOL/L — SIGNIFICANT CHANGE UP (ref 135–146)
VZV IGM SER-ACNC: <0.91 INDEX — SIGNIFICANT CHANGE UP (ref 0–0.9)
WBC # BLD: 10.97 K/UL — HIGH (ref 4.8–10.8)
WBC # BLD: 8.77 K/UL — SIGNIFICANT CHANGE UP (ref 4.8–10.8)
WBC # FLD AUTO: 10.97 K/UL — HIGH (ref 4.8–10.8)
WBC # FLD AUTO: 8.77 K/UL — SIGNIFICANT CHANGE UP (ref 4.8–10.8)

## 2021-09-28 PROCEDURE — 74181 MRI ABDOMEN W/O CONTRAST: CPT | Mod: 26

## 2021-09-28 PROCEDURE — 99232 SBSQ HOSP IP/OBS MODERATE 35: CPT

## 2021-09-28 PROCEDURE — 99239 HOSP IP/OBS DSCHRG MGMT >30: CPT

## 2021-09-28 RX ORDER — PANTOPRAZOLE SODIUM 20 MG/1
1 TABLET, DELAYED RELEASE ORAL
Qty: 30 | Refills: 0
Start: 2021-09-28 | End: 2021-10-27

## 2021-09-28 RX ADMIN — PANTOPRAZOLE SODIUM 40 MILLIGRAM(S): 20 TABLET, DELAYED RELEASE ORAL at 05:18

## 2021-09-28 RX ADMIN — CHLORHEXIDINE GLUCONATE 1 APPLICATION(S): 213 SOLUTION TOPICAL at 05:18

## 2021-09-28 RX ADMIN — Medication 100 MILLIGRAM(S): at 11:59

## 2021-09-28 RX ADMIN — Medication 20 MILLIGRAM(S): at 11:59

## 2021-09-28 RX ADMIN — Medication 1 MILLIGRAM(S): at 11:59

## 2021-09-28 NOTE — PROGRESS NOTE ADULT - SUBJECTIVE AND OBJECTIVE BOX
Patient is a 50y old  Female who presents with a chief complaint of Abd pain (28 Sep 2021 14:04)      Subjective: Pt feels well and was anxious to go home. Did not have any complaints today.      Vital Signs Last 24 Hrs  T(C): 36.3 (28 Sep 2021 12:25), Max: 36.3 (28 Sep 2021 12:25)  T(F): 97.4 (28 Sep 2021 12:25), Max: 97.4 (28 Sep 2021 12:25)  HR: 80 (28 Sep 2021 12:25) (80 - 82)  BP: 128/55 (28 Sep 2021 12:25) (103/58 - 128/55)  BP(mean): --  RR: 16 (28 Sep 2021 12:25) (16 - 18)  SpO2: --    PHYSICAL EXAM  General: adult in NAD  HEENT: clear oropharynx, anicteric sclera, pink conjunctiva  Neck: supple  CV: normal S1/S2 with no murmur rubs or gallops  Lungs: positive air movement b/l ant lungs,clear to auscultation, no wheezes, no rales  Abdomen: soft non-tender non-distended, no hepatosplenomegaly  Ext: no clubbing cyanosis or edema  Skin: no rashes and no petechiae  Neuro: alert and oriented X 4, no focal deficits    MEDICATIONS  (STANDING):  chlorhexidine 4% Liquid 1 Application(s) Topical <User Schedule>  FLUoxetine 20 milliGRAM(s) Oral daily  folic acid 1 milliGRAM(s) Oral daily  LORazepam     Tablet 1.5 milliGRAM(s) Oral daily  melatonin 10 milliGRAM(s) Oral at bedtime  pantoprazole    Tablet 40 milliGRAM(s) Oral before breakfast  thiamine 100 milliGRAM(s) Oral daily    MEDICATIONS  (PRN):  acetaminophen   Tablet .. 650 milliGRAM(s) Oral every 6 hours PRN Mild Pain (1 - 3), Moderate Pain (4 - 6)  LORazepam     Tablet 2 milliGRAM(s) Oral every 1 hour PRN CIWA-Ar score 8 or greater      LABS:                          9.6    8.77  )-----------( 133      ( 28 Sep 2021 13:30 )             30.0         Mean Cell Volume : 87.7 fL  Mean Cell Hemoglobin : 28.1 pg  Mean Cell Hemoglobin Concentration : 32.0 g/dL  Auto Neutrophil # : 0.98 K/uL  Auto Lymphocyte # : 7.41 K/uL  Auto Monocyte # : 0.28 K/uL  Auto Eosinophil # : 0.02 K/uL  Auto Basophil # : 0.03 K/uL  Auto Neutrophil % : 11.2 %  Auto Lymphocyte % : 84.5 %  Auto Monocyte % : 3.2 %  Auto Eosinophil % : 0.2 %  Auto Basophil % : 0.3 %      Serial CBC's   @ 13:30  Hct-30.0 / Hgb-9.6 / Plat-133 / RBC-3.42 / WBC-8.77  Serial CBC's   @ 03:16  Hct-25.1 / Hgb-8.1 / Plat-96 / RBC-2.85 / WBC-10.97  Serial CBC's   @ 04:30  Hct-29.1 / Hgb-9.4 / Plat-87 / RBC-3.30 / WBC-7.86  Serial CBC's   @ 04:30  Hct-26.8 / Hgb-8.8 / Plat-59 / RBC-3.03 / WBC-8.29  Serial CBC's   @ 11:00  Hct--- / Hgb--- / Plat--- / RBC-3.27 / WBC---  Serial CBC's   @ 07:54  Hct-27.8 / Hgb-9.3 / Plat-40 / RBC-3.21 / WBC-9.57          137  |  104  |  7<L>  ----------------------------<  108<H>  3.9   |  25  |  0.7    Ca    8.4<L>      28 Sep 2021 04:30  Mg     2.2         TPro  6.3  /  Alb  3.4<L>  /  TBili  0.4  /  DBili  x   /  AST  28  /  ALT  29  /  AlkPhos  198<H>            Folate, Serum: 12.6 ng/mL ( @ 11:00)  Vitamin B12, Serum: 362 pg/mL ( @ 11:00)  Reticulocyte Percent: 0.9 % ( @ 11:00)  Iron - Total Binding Capacity.: 301 ug/dL ( @ 07:54)  Ferritin, Serum: 446 ng/mL ( @ 07:54)      Quantitative Ig mg/dL ( @ 11:00)  Quantitative IgA: 468 mg/dL ( @ 11:00)  Quantitative IgM: 100 mg/dL ( @ 11:00)  MANJU Lambda: 3.31 mg/dL ( @ 11:00)  MANJU Kappa: 3.44 mg/dL ( @ 11:00)        RADIOLOGY & ADDITIONAL STUDIES:    < from: MR MRCP No Cont (21 @ 08:48) >    IMPRESSION:    No choledocholithiasis. No biliary ductal dilatation.    Redemonstrated hepatosplenomegaly.    Redemonstrated pericholecystic fluid/gallbladder wall thickening.    No definite gallstones seen by MRI.    < end of copied text >

## 2021-09-28 NOTE — DISCHARGE NOTE PROVIDER - CARE PROVIDERS DIRECT ADDRESSES
[email or web address]

## 2021-09-28 NOTE — DISCHARGE NOTE PROVIDER - NSDCMRMEDTOKEN_GEN_ALL_CORE_FT
Ativan: 1.5 milligram(s) orally once a day  PROzac 20 mg oral capsule: 1 cap(s) orally once a day   Ativan: 1.5 milligram(s) orally once a day  Protonix 40 mg oral delayed release tablet: 1 tab(s) orally once a day (before a meal)  PROzac 20 mg oral capsule: 1 cap(s) orally once a day

## 2021-09-28 NOTE — DISCHARGE NOTE NURSING/CASE MANAGEMENT/SOCIAL WORK - NSDCPEFALRISK_GEN_ALL_CORE
For information on Fall & injury Prevention, visit [email or web address]

## 2021-09-28 NOTE — DISCHARGE NOTE PROVIDER - HOSPITAL COURSE
Ms. Ceci Baum is a 51 yo woman  who presented to the ED for 1 month history of abdominal pain and fever.    # Abdominal pain and fever: R/O passed stone vs viral hepatitis  # Transaminitis   - CT abdomen U/S demonstrates no GS GB edema, eloise-cholecystic fluid with hepatomegaly, splenomegaly but HIDA neg  - LFTS trending down  - CLD: HAV/HBV/HCV/EBV IgM neg  - Per Hemonc consult:  - DIC panel unremarkable    -Viral  hepatitis panel; EBV serology indicative of past infection. Send for HIV   -Iron studies noted, Ferritin is elevated to 446 pointing towards ACD  -Retic and Hapto WNL; LDH elevated 365; Kemi negative   -SPEP and Immunoglobulin levels pending   -Flow cytometry results are pending   - MRCP: No choledocholithiasis. No biliary ductal dilatation. Re-demonstrated hepatosplenomegaly. Redemonstrated pericholecystic fluid/gallbladder wall thickening. No definite gallstones seen by MRI.  -CT Chest shows small b/l pleural effusions, b/l axillary nonspecific lymphadenopathy and right middle lobe subcentimeter nodules.   -Possible Bone marrow biopsy pending workup   - Surgery: No surgical intervention given negative Hida scan  - GI: LFTs improving, no need to f/u outpatient, can f/u with hemonc outpatient       #Thrombocytopenia with hepatosplenomegaly   - r/o myeloproliferative disorders, ITP vs others.  - hepatitis panel negative, EBV antibodies - suggestive of past infection   - Per Hemonc consult:   - Could be secondary to underlying infectious/viral etiology +/-alcohol use; need to r/o hematological neoplastic  process in the setting of B symptoms (fever, night sweats) and hepatosplenomegaly  - possible bone marrow biopsy if flow cytometry positive by hem/onc   - Follow up with hemeonc outpatient for final results pending       # alcohol abuse:   - reports recent use with loss of family member, no signs of withdrawal on exam, catch team consult, patient counselled     # anxiety:   - continue prn ativan     Ms. Ceci Baum is a 49 yo woman  who presented to the ED for 1 month history of abdominal pain and fever.    # Abdominal pain and fever: R/O passed stone vs viral hepatitis  # Transaminitis   - CT abdomen U/S demonstrates no GS GB edema, eloise-cholecystic fluid with hepatomegaly, splenomegaly but HIDA neg  - LFTS trending down  - CLD: HAV/HBV/HCV/EBV IgM neg  - Per Hemonc consult:  - DIC panel unremarkable    -Viral  hepatitis panel; EBV serology indicative of past infection. Send for HIV   -Iron studies noted, Ferritin is elevated to 446 pointing towards ACD  -Retic and Hapto WNL; LDH elevated 365; Kemi negative   -SPEP and Immunoglobulin levels pending   -Flow cytometry results are pending   - MRCP: No choledocholithiasis. No biliary ductal dilatation. Re-demonstrated hepatosplenomegaly. Redemonstrated pericholecystic fluid/gallbladder wall thickening. No definite gallstones seen by MRI.  -CT Chest shows small b/l pleural effusions, b/l axillary nonspecific lymphadenopathy and right middle lobe subcentimeter nodules.   Ms. Ceci Baum is a 49 yo woman  who presented to the ED for 1 month history of abdominal pain and fever.    # Abdominal pain and fever: R/O passed stone vs viral hepatitis  # Transaminitis   - CT abdomen U/S demonstrates no GS GB edema, eloise-cholecystic fluid with hepatomegaly, splenomegaly but HIDA neg  - LFTS trending down  - CLD: HAV/HBV/HCV/EBV IgM neg  - Per Hemonc consult:  - DIC panel unremarkable    -Viral  hepatitis panel; EBV serology indicative of past infection. Send for HIV   -Iron studies noted, Ferritin is elevated to 446 pointing towards ACD  -Retic and Hapto WNL; LDH elevated 365; Kemi negative   -SPEP and Immunoglobulin levels pending   -Flow cytometry results are pending   - MRCP: No choledocholithiasis. No biliary ductal dilatation. Re-demonstrated hepatosplenomegaly. Redemonstrated pericholecystic fluid/gallbladder wall thickening. No definite gallstones seen by MRI.  -CT Chest shows small b/l pleural effusions, b/l axillary nonspecific lymphadenopathy and right middle lobe subcentimeter nodules.   -Possible Bone marrow biopsy pending workup   - Surgery: No surgical intervention given negative Hida scan  - GI: LFTs improving, no need to f/u outpatient, can f/u with hemonc outpatient     #Thrombocytopenia with hepatosplenomegaly   - r/o myeloproliferative disorders, ITP vs others.  - hepatitis panel negative, EBV antibodies - suggestive of past infection   - Per Hemonc consult:   - Could be secondary to underlying infectious/viral etiology +/-alcohol use; need to r/o hematological neoplastic  process in the setting of B symptoms (fever, night sweats) and hepatosplenomegaly  - possible bone marrow biopsy if flow cytometry positive by hem/onc       # alcohol abuse:   - reports recent use with loss of family member, no signs of withdrawal on exam, catch team consult, patient counselled     # anxiety:   - continue prn ativan    #Dispo: stable for discharge with hemonc f/u     -Possible Bone marrow biopsy pending workup   - Surgery: No surgical intervention given negative Hida scan  - GI: LFTs improving, no need to f/u outpatient, can f/u with hemonc outpatient       #Thrombocytopenia with hepatosplenomegaly   - r/o myeloproliferative disorders, ITP vs others.  - hepatitis panel negative, EBV antibodies - suggestive of past infection   - Per Hemonc consult:   - Could be secondary to underlying infectious/viral etiology +/-alcohol use; need to r/o hematological neoplastic  process in the setting of B symptoms (fever, night sweats) and hepatosplenomegaly  - possible bone marrow biopsy if flow cytometry positive by hem/onc   [facility]       # alcohol abuse:   - reports recent use with loss of family member, no signs of withdrawal on exam, catch team consult, patient counselled     # anxiety:   - continue prn ativan     Ms. Ceci Baum is a 49 yo woman  who presented to the ED for 1 month history of abdominal pain and fever.    # Abdominal pain and fever: R/O passed stone vs viral hepatitis  # Transaminitis   - CT abdomen U/S demonstrates no GS GB edema, eloise-cholecystic fluid with hepatomegaly, splenomegaly but HIDA neg  - LFTS trending down  - CLD: HAV/HBV/HCV/EBV IgM neg  - Per Hemonc consult:  - DIC panel unremarkable    -Viral  hepatitis panel; EBV serology indicative of past infection. Send for HIV   -Iron studies noted, Ferritin is elevated to 446 pointing towards ACD  -Retic and Hapto WNL; LDH elevated 365; Kemi negative   -SPEP and Immunoglobulin levels pending   -Flow cytometry results are pending   - MRCP: No choledocholithiasis. No biliary ductal dilatation. Re-demonstrated hepatosplenomegaly. Redemonstrated pericholecystic fluid/gallbladder wall thickening. No definite gallstones seen by MRI.  -CT Chest shows small b/l pleural effusions, b/l axillary nonspecific lymphadenopathy and right middle lobe subcentimeter nodules.   Ms. Ceci Baum is a 49 yo woman  who presented to the ED for 1 month history of abdominal pain and fever.    # Abdominal pain and fever: R/O passed stone vs viral hepatitis  # Transaminitis   - CT abdomen U/S demonstrates no GS GB edema, eloise-cholecystic fluid with hepatomegaly, splenomegaly but HIDA neg  - LFTS trending down  - CLD: HAV/HBV/HCV/EBV IgM neg  - Per Hemonc consult:  - DIC panel unremarkable    -Viral  hepatitis panel; EBV serology indicative of past infection. Send for HIV   -Iron studies noted, Ferritin is elevated to 446 pointing towards ACD  -Retic and Hapto WNL; LDH elevated 365; Kemi negative   -SPEP and Immunoglobulin levels pending   -Flow cytometry results are pending   - MRCP: No choledocholithiasis. No biliary ductal dilatation. Re-demonstrated hepatosplenomegaly. Redemonstrated pericholecystic fluid/gallbladder wall thickening. No definite gallstones seen by MRI.  -CT Chest shows small b/l pleural effusions, b/l axillary nonspecific lymphadenopathy and right middle lobe subcentimeter nodules.   -Possible Bone marrow biopsy pending workup   - Surgery: No surgical intervention given negative Hida scan  - GI: LFTs improving, no need to f/u outpatient, can f/u with hemonc outpatient     #Thrombocytopenia with hepatosplenomegaly   - r/o myeloproliferative disorders, ITP vs others.  - hepatitis panel negative, EBV antibodies - suggestive of past infection   - Per Hemonc consult:   - Could be secondary to underlying infectious/viral etiology +/-alcohol use; need to r/o hematological neoplastic  process in the setting of B symptoms (fever, night sweats) and hepatosplenomegaly  - possible bone marrow biopsy if flow cytometry positive by hem/onc       # alcohol abuse:   - reports recent use with loss of family member, no signs of withdrawal on exam, catch team consult, patient counselled     # anxiety:   - continue prn ativan    stable for discharge with [facility] f/u     -Possible Bone marrow biopsy pending workup   - Surgery: No surgical intervention given negative Hida scan  - GI: LFTs improving, no need to f/u outpatient, can f/u with hemonc outpatient       #Thrombocytopenia with hepatosplenomegaly   - r/o myeloproliferative disorders, ITP vs others.  - hepatitis panel negative, EBV antibodies - suggestive of past infection   - Per Hemonc consult:   - Could be secondary to underlying infectious/viral etiology +/-alcohol use; need to r/o hematological neoplastic  process in the setting of B symptoms (fever, night sweats) and hepatosplenomegaly  - possible bone marrow biopsy if flow cytometry positive by hem/onc   [facility]       # alcohol abuse:   - reports recent use with loss of family member, no signs of withdrawal on exam, catch team consult, patient counselled     # anxiety:   - continue prn ativan

## 2021-09-28 NOTE — PROGRESS NOTE ADULT - SUBJECTIVE AND OBJECTIVE BOX
CECI OJEDA 50y Female  MRN#: [id number]   CODE STATUS:________    Hospital Day: 5d    Pt is currently admitted with the primary diagnosis of     SUBJECTIVE      Overnight events: No overnight events     Subjective complaints     Present Today:   - Edvin:  No [  ], Yes [   ] : Indication:     - Type of IV Access:       .. CVC/Piccline:  No [  ], Yes [   ] : Indication:       .. Midline: No [  ], Yes [   ] : Indication:                                             ----------------------------------------------------------  OBJECTIVE  PAST MEDICAL & SURGICAL HISTORY                                            -----------------------------------------------------------  ALLERGIES:  No Known Allergies                                            ------------------------------------------------------------    HOME MEDICATIONS  Home Medications:  Ativan: 1.5 milligram(s) orally once a day (24 Sep 2021 01:22)  PROzac 20 mg oral capsule: 1 cap(s) orally once a day (24 Sep 2021 01:22)                           MEDICATIONS:  STANDING MEDICATIONS  chlorhexidine 4% Liquid 1 Application(s) Topical <User Schedule>  FLUoxetine 20 milliGRAM(s) Oral daily  folic acid 1 milliGRAM(s) Oral daily  LORazepam     Tablet 1.5 milliGRAM(s) Oral daily  melatonin 10 milliGRAM(s) Oral at bedtime  pantoprazole    Tablet 40 milliGRAM(s) Oral before breakfast  thiamine 100 milliGRAM(s) Oral daily    PRN MEDICATIONS  acetaminophen   Tablet .. 650 milliGRAM(s) Oral every 6 hours PRN  LORazepam     Tablet 2 milliGRAM(s) Oral every 1 hour PRN                                            ------------------------------------------------------------  VITAL SIGNS: Last 24 Hours  T(C): 36.2 (28 Sep 2021 05:00), Max: 36.3 (27 Sep 2021 12:55)  T(F): 97.1 (28 Sep 2021 05:00), Max: 97.3 (27 Sep 2021 12:55)  HR: 82 (28 Sep 2021 05:00) (78 - 82)  BP: 103/58 (28 Sep 2021 05:00) (103/58 - 123/56)  BP(mean): --  RR: 18 (28 Sep 2021 05:00) (18 - 18)  SpO2: --                                             --------------------------------------------------------------  LABS:                        8.1    10.97 )-----------( 96       ( 28 Sep 2021 03:16 )             25.1     09-28    138  |  106  |  8<L>  ----------------------------<  103<H>  4.3   |  23  |  0.8    Ca    8.2<L>      28 Sep 2021 03:16  Mg     2.2     09-28    TPro  5.9<L>  /  Alb  3.2<L>  /  TBili  0.4  /  DBili  x   /  AST  29  /  ALT  29  /  AlkPhos  200<H>  09-28                                                              -------------------------------------------------------------  RADIOLOGY:                                            --------------------------------------------------------------    PHYSICAL EXAM:  GENERAL: NAD  EYES: conjunctiva and sclera clear  ENT: Moist mucous membranes  NECK: Supple, No JVD  CHEST/LUNG: Clear to auscultation bilaterally; No rales, rhonchi, wheezing, or rubs. Unlabored respirations  HEART: Regular rate and rhythm; No murmurs, rubs, or gallops  ABDOMEN: BSx4; Soft, mild-moderate epigastric and RUQ tenderness, Art -ve, nondistended  EXTREMITIES: No LE edema  NERVOUS SYSTEM:  A&Ox3                                           --------------------------------------------------------------    ASSESSMENT & PLAN    Ms. Ceci Ojeda is a 49 yo woman  who presented to the ED for 1 month history of abdominal pain and fever.    # Abdominal pain and fever: R/O passed stone vs viral hepatitis  # Transaminitis   - CT abdomen U/S demonstrates no GS GB edema, eloise-cholecystic fluid with hepatomegaly, splenomegaly but HIDA neg  - LFTS trending down  - CLD: HAV/HBV/HCV/EBV IgM neg  - Per Hemonc consult:  - DIC panel unremarkable    -Viral  hepatitis panel; EBV serology indicative of past infection. Send for HIV   -Iron studies noted, Ferritin is elevated to 446 pointing towards ACD  -Retic and Hapto WNL; LDH elevated 365; Kemi negative   -SPEP and Immunoglobulin levels pending   -Flow cytometry results are pending   -MRCP pending  -CT Chest shows small b/l pleural effusions, b/l axillary nonspecific lymphadenopathy and right middle lobe subcentimeter nodules.   -Possible Bone marrow biopsy pending workup   - Surgery: No surgical intervention given negative Hida scan  - GI: Appreciate recs       #Thrombocytopenia with hepatosplenomegaly   - r/o myeloproliferative disorders, ITP vs others.  - hepatitis panel negative, EBV antibodies - suggestive of past infection   - Per Hemonc consult:   - Could be secondary to underlying infectious/viral etiology +/-alcohol use; need to r/o hematological neoplastic  process in the setting of B symptoms (fever, night sweats) and hepatosplenomegaly  - possible bone marrow biopsy if flow cytometry positive by hem/onc       # alcohol abuse:   - reports recent use with loss of family member, no signs of withdrawal on exam, catch team consult, patient counselled     # anxiety:   - continue prn ativan    Provider handoff: pending mrcp, tolerating regular diet and further workup per hemonc                          CECI OJEDA 50y Female  MRN#: [id number]   CODE STATUS: FULL     Hospital Day: 5d    Pt is currently admitted with the primary diagnosis of abdominal pain     SUBJECTIVE      Overnight events: No overnight events     Subjective complaints: She has no complaints. Denies fevers, headaches, sob, chest pain, N/V/D, abdominal pain, LE edema. She would like to be discharged.     Present Today:   - Pardo:  No [  ], Yes [   ] : Indication:     - Type of IV Access:       .. CVC/Piccline:  No [  ], Yes [   ] : Indication:       .. Midline: No [  ], Yes [   ] : Indication:                                             ----------------------------------------------------------  OBJECTIVE  PAST MEDICAL & SURGICAL HISTORY                                            -----------------------------------------------------------  ALLERGIES:  No Known Allergies                                            ------------------------------------------------------------    HOME MEDICATIONS  Home Medications:  Ativan: 1.5 milligram(s) orally once a day (24 Sep 2021 01:22)  PROzac 20 mg oral capsule: 1 cap(s) orally once a day (24 Sep 2021 01:22)                           MEDICATIONS:  STANDING MEDICATIONS  chlorhexidine 4% Liquid 1 Application(s) Topical <User Schedule>  FLUoxetine 20 milliGRAM(s) Oral daily  folic acid 1 milliGRAM(s) Oral daily  LORazepam     Tablet 1.5 milliGRAM(s) Oral daily  melatonin 10 milliGRAM(s) Oral at bedtime  pantoprazole    Tablet 40 milliGRAM(s) Oral before breakfast  thiamine 100 milliGRAM(s) Oral daily    PRN MEDICATIONS  acetaminophen   Tablet .. 650 milliGRAM(s) Oral every 6 hours PRN  LORazepam     Tablet 2 milliGRAM(s) Oral every 1 hour PRN                                            ------------------------------------------------------------  VITAL SIGNS: Last 24 Hours  T(C): 36.2 (28 Sep 2021 05:00), Max: 36.3 (27 Sep 2021 12:55)  T(F): 97.1 (28 Sep 2021 05:00), Max: 97.3 (27 Sep 2021 12:55)  HR: 82 (28 Sep 2021 05:00) (78 - 82)  BP: 103/58 (28 Sep 2021 05:00) (103/58 - 123/56)  BP(mean): --  RR: 18 (28 Sep 2021 05:00) (18 - 18)  SpO2: --                                             --------------------------------------------------------------  LABS:                        8.1    10.97 )-----------( 96       ( 28 Sep 2021 03:16 )             25.1     09-28    138  |  106  |  8<L>  ----------------------------<  103<H>  4.3   |  23  |  0.8    Ca    8.2<L>      28 Sep 2021 03:16  Mg     2.2     09-28    TPro  5.9<L>  /  Alb  3.2<L>  /  TBili  0.4  /  DBili  x   /  AST  29  /  ALT  29  /  AlkPhos  200<H>  09-28                                                  -------------------------------------------------------------  RADIOLOGY:                                            --------------------------------------------------------------    PHYSICAL EXAM:  GENERAL: NAD  EYES: conjunctiva and sclera clear  ENT: Moist mucous membranes  NECK: Supple, No JVD  CHEST/LUNG: Clear to auscultation bilaterally; No rales, rhonchi, wheezing, or rubs. Unlabored respirations  HEART: Regular rate and rhythm; No murmurs, rubs, or gallops  ABDOMEN: BSx4; Soft, mild-moderate epigastric and RUQ tenderness, Art -ve, nondistended  EXTREMITIES: No LE edema  NERVOUS SYSTEM:  A&Ox3                                           --------------------------------------------------------------    ASSESSMENT & PLAN    Ms. eCci Ojeda is a 49 yo woman  who presented to the ED for 1 month history of abdominal pain and fever.    # Abdominal pain and fever: R/O passed stone vs viral hepatitis  # Transaminitis   - CT abdomen U/S demonstrates no GS GB edema, eloise-cholecystic fluid with hepatomegaly, splenomegaly but HIDA neg  - LFTS trending down  - CLD: HAV/HBV/HCV/EBV IgM neg  - Per Hemonc consult:  - DIC panel unremarkable    -Viral  hepatitis panel; EBV serology indicative of past infection. Send for HIV   -Iron studies noted, Ferritin is elevated to 446 pointing towards ACD  -Retic and Hapto WNL; LDH elevated 365; Kemi negative   -SPEP and Immunoglobulin levels pending   -Flow cytometry results are pending   - MRCP: No choledocholithiasis. No biliary ductal dilatation. Re-demonstrated hepatosplenomegaly. Redemonstrated pericholecystic fluid/gallbladder wall thickening. No definite gallstones seen by MRI.  -CT Chest shows small b/l pleural effusions, b/l axillary nonspecific lymphadenopathy and right middle lobe subcentimeter nodules.   -Possible Bone marrow biopsy pending workup   - Surgery: No surgical intervention given negative Hida scan  - GI: LFTs improving, no need to f/u outpatient, can f/u with hemonc outpatient     #Thrombocytopenia with hepatosplenomegaly   - r/o myeloproliferative disorders, ITP vs others.  - hepatitis panel negative, EBV antibodies - suggestive of past infection   - Per Hemonc consult:   - Could be secondary to underlying infectious/viral etiology +/-alcohol use; need to r/o hematological neoplastic  process in the setting of B symptoms (fever, night sweats) and hepatosplenomegaly  - possible bone marrow biopsy if flow cytometry positive by hem/onc       # alcohol abuse:   - reports recent use with loss of family member, no signs of withdrawal on exam, catch team consult, patient counselled     # anxiety:   - continue prn ativan    #Dispo: stable for discharge with hemonc f/u

## 2021-09-28 NOTE — PROGRESS NOTE ADULT - REASON FOR ADMISSION
Abd pain

## 2021-09-28 NOTE — DISCHARGE NOTE PROVIDER - NSDCPNSUBOBJ_GEN_ALL_CORE
Patient is a 50y old  Female who presents with a chief complaint of Abd pain (28 Sep 2021 14:04)      INTERVAL HPI/OVERNIGHT EVENTS: no acute overnight events noted.   Patient seen and examined at bedside.     REVIEW OF SYSTEMS: negative  Vital Signs Last 24 Hrs  T(C): 36.3 (28 Sep 2021 12:25), Max: 36.3 (28 Sep 2021 12:25)  T(F): 97.4 (28 Sep 2021 12:25), Max: 97.4 (28 Sep 2021 12:25)  HR: 80 (28 Sep 2021 12:25) (80 - 82)  BP: 128/55 (28 Sep 2021 12:25) (103/58 - 128/55)  BP(mean): --  RR: 16 (28 Sep 2021 12:25) (16 - 18)  SpO2: --    PHYSICAL EXAM:   NAD; Normocephalic;   LUNGS - no wheezing  HEART: S1 S2+   ABDOMEN: Soft, Nontender, non distended  EXTREMITIES: no cyanosis; no edema  NERVOUS SYSTEM:  Awake and alert; no focal neuro deficits appreciated    LABS:                        9.6    8.77  )-----------( 133      ( 28 Sep 2021 13:30 )             30.0     09-28    137  |  104  |  7<L>  ----------------------------<  108<H>  3.9   |  25  |  0.7    Ca    8.4<L>      28 Sep 2021 04:30  Mg     2.2     09-28    TPro  6.3  /  Alb  3.4<L>  /  TBili  0.4  /  DBili  x   /  AST  28  /  ALT  29  /  AlkPhos  198<H>  09-28        CAPILLARY BLOOD GLUCOSE          Medications:  MEDICATIONS  (STANDING):  chlorhexidine 4% Liquid 1 Application(s) Topical <User Schedule>  FLUoxetine 20 milliGRAM(s) Oral daily  folic acid 1 milliGRAM(s) Oral daily  LORazepam     Tablet 1.5 milliGRAM(s) Oral daily  melatonin 10 milliGRAM(s) Oral at bedtime  pantoprazole    Tablet 40 milliGRAM(s) Oral before breakfast  thiamine 100 milliGRAM(s) Oral daily    MEDICATIONS  (PRN):  acetaminophen   Tablet .. 650 milliGRAM(s) Oral every 6 hours PRN Mild Pain (1 - 3), Moderate Pain (4 - 6)  LORazepam     Tablet 2 milliGRAM(s) Oral every 1 hour PRN CIWA-Ar score 8 or greater    Plan:   Ms. Ceci Baum is a 51 yo woman  who presented to the ED for 1 month history of abdominal pain and fever.    # Abdominal pain and fever: R/O passed stone vs viral hepatitis  # transaminitis   - CT abdomen U/S demonstrates no GS GB edema, eloise-cholecystic fluid with hepatomegaly, splenomegaly but HIDA neg  - LFTS trending down  - CLD: HAV/HBV/HCV/EBV IgM neg  - follow Anti HEV, Serum Ferritin, Transferrin Saturation, Ceruloplasmin level, EVARISTO, SMA, immunoglobulins panel, AMA, Anti LK microsomal Ab, anti-soluble liver Ag, CMV PCR, VZV and HSV serology and PCR  - MRCP - results noted   - tolerating regular diet     #Thrombocytopenia with hepatosplenomegaly   - r/o myeloproliferative disorders, ITP vs others.  - hepatitis panel negative, EBV antibodies - suggestive of past infection   - follow flow cytometry --> will decide further work up based on results   - possible bone marrow biopsy if flow cytometry positive by hem/onc   - CT chest - Small bilateral pleural effusions. Right middle lobe nodules including several subcentimeter nodules within the terminal airways. Nonspecific bilateral axillary lymphadenopathy.  - CT Abdomen and pelvis - Unchanged hepatosplenomegaly and periportal edema. Persistent distended gallbladder with slightly decreased gallbladder wall edema. Small volume abdominopelvic ascites, unchanged.      # alcohol abuse: reports recent use with loss of family member, no signs of withdrawal on exam, catch team consult, patient counselled     # anxiety: continue prn ativan    - outpatient follow up with hem/onc for follow up for pending test results.

## 2021-09-28 NOTE — DISCHARGE NOTE PROVIDER - NSDCCPCAREPLAN_GEN_ALL_CORE_FT
PRINCIPAL DISCHARGE DIAGNOSIS  Diagnosis: Hepatosplenomegaly  Assessment and Plan of Treatment: You came in with abdominal pain. We found that your liver and your spleen was enlarged. We also found fluid around your gallbladder. We did a Hida scan and MRCP that was negative. You do not have cholecystitis.   If you have abdominal pain, fevers, weakness, please return to the emergency or follow up with your primary care physician as soon as possible.      SECONDARY DISCHARGE DIAGNOSES  Diagnosis: Transaminitis  Assessment and Plan of Treatment: Your liver enzymes were elevated and have now returned to normal. Please follow up with your primary care physician to re check your liver enzymes.       Diagnosis: Thrombocytopenia  Assessment and Plan of Treatment: Your platelet levels were very low. They have no started uptrending. Please follow up with your [facility] doctor. They will call you with an appointment.     PRINCIPAL DISCHARGE DIAGNOSIS  Diagnosis: Hepatosplenomegaly  Assessment and Plan of Treatment: You came in with abdominal pain. We found that your liver and your spleen was enlarged. We also found fluid around your gallbladder. We did a Hida scan and MRCP that was negative. You do not have cholecystitis.   If you have abdominal pain, fevers, weakness, please return to the emergency or follow up with your primary care physician as soon as possible.      SECONDARY DISCHARGE DIAGNOSES  Diagnosis: Transaminitis  Assessment and Plan of Treatment: Your liver enzymes were elevated and have now returned to normal. Please follow up with your primary care physician to re check your liver enzymes.       Diagnosis: Thrombocytopenia  Assessment and Plan of Treatment: Your platelet levels were very low. They have now started uptrending. Please follow up with your heme/onc doctor. They will call you with an appointment.

## 2021-09-28 NOTE — DISCHARGE NOTE NURSING/CASE MANAGEMENT/SOCIAL WORK - PATIENT PORTAL LINK FT
You can access the FollowMyHealth Patient Portal offered by [facility] by registering at the following website: [email or web address]. By joining [employer]’s FollowMyHealth portal, you will also be able to view your health information using other applications (apps) compatible with our system.

## 2021-09-28 NOTE — DISCHARGE NOTE PROVIDER - CARE PROVIDER_API CALL
Dev Rodriguez (DO)  Hematology; Internal Medicine; Medical Oncology  [address] [place] [id number]  Phone: [phone]  Fax: [phone]  Follow Up Time:     Ruthie Huggins  Internal Medicine  [address] [place] [id number]  Phone: [phone]  Fax: ()-  Follow Up Time:

## 2021-09-28 NOTE — PROGRESS NOTE ADULT - ASSESSMENT
The patient is a 50 year old female with a PMH of IBS and anxiety, she is being admitted for fever and RUQ abdominal pain, suspected cholecystitis. Hematology evaluation requested for evaluation of thrombocytopenia and hepatosplenomegaly.    #Thrombocytopenia -no previous records to compare: can not state acute, subacute or chronic  #Anemia (likely Anemia of Chronic Disease)   -Could be secondary to underlying infectious/viral etiology +/-alcohol use; need to r/o hematological neoplastic  process in the setting of B symptoms (fever, night sweats) and hepatosplenomegaly  -Peripheral smear reviewed as above    #Hepatosplenomegaly  #?Cholecystitis with elevated LFTs -HIDA scan neg  -As per abdominal CT: IMPRESSION: Periportal edema and extensive gallbladder wall edema/thickening. Findings are nonspecific. Hepatosplenomegaly. Trace free pelvic ascites and right pleural effusion.   -As per RUQ US: IMPRESSION: Diffusely thickened edematous gallbladder wall and surrounding pericholecystic fluid. Findings concerning for cholecystitis. Hepatomegaly  -As per HIDA Scan: HEPATOMEGALY; OTHER THAN HEPATOMEGALY, NORMAL HEPATOBILIARY IMAGES.   DEFINITE VISUALIZATION OF THE GALLBLADDER BY 5 MINUTES POST INJECTION, DEMONSTRATING PATENCY OF THE CYSTIC DUCT.  -r/o hematological neoplastic  process in the setting of B symptoms (fever, night sweats)     PLAN:    -Viral  hepatitis panel pending; EBV serology indicative of past infection. Send for HIV   -Iron studies noted, Ferritin is elevated to 446 pointing towards ACD  -Retic and Hapto WNL; LDH elevated 365; Kemi negative   -SPEP and Immunoglobulin levels pending   -Flow cytometry results are pending   -MRCP done: no choledocholithiasis  -CT Chest shows small b/l pleural effusions, b/l axillary nonspecific lymphadenopathy and right middle lobe subcentimeter nodules.   -DIC panel unremarkable   -Reviewed records from pt's PCP about previous CBCs : pt did have mild anemia but normal plt counts    Awaiting results of flow cytometry  to determine further work up. If flow cytometry shows any suspicion for underlying hematological condition, will consider bone marrow biopsy. Pt does not have to stay inpatient for the flow cytometry results if medically cleared for discharge. We can follow up results outpatient, and pt will have a follow up with Dr Castanon to discuss results of flow cytometry, repeat CBC and potential bone marrow biopsy. Pt would like to follow up with PMD for CBC and then would like to see her own hematologist if flow cytometry is wnl.        The patient is a 50 year old female with a PMH of IBS and anxiety, she is being admitted for fever and RUQ abdominal pain, suspected cholecystitis. Hematology evaluation requested for evaluation of thrombocytopenia and hepatosplenomegaly.    #Thrombocytopenia -reviewed old records: NEW development  #Anemia (likely Anemia of Chronic Disease)   -Could be secondary to underlying infectious/viral etiology +/-alcohol use; need to r/o hematological neoplastic  process in the setting of B symptoms (fever, night sweats) and hepatosplenomegaly  -Peripheral smear reviewed as above    #Hepatosplenomegaly  #?Cholecystitis with elevated LFTs -HIDA scan neg  -As per abdominal CT: IMPRESSION: Periportal edema and extensive gallbladder wall edema/thickening. Findings are nonspecific. Hepatosplenomegaly. Trace free pelvic ascites and right pleural effusion.   -As per RUQ US: IMPRESSION: Diffusely thickened edematous gallbladder wall and surrounding pericholecystic fluid. Findings concerning for cholecystitis. Hepatomegaly  -As per HIDA Scan: HEPATOMEGALY; OTHER THAN HEPATOMEGALY, NORMAL HEPATOBILIARY IMAGES.   DEFINITE VISUALIZATION OF THE GALLBLADDER BY 5 MINUTES POST INJECTION, DEMONSTRATING PATENCY OF THE CYSTIC DUCT.  -r/o hematological neoplastic  process in the setting of B symptoms (fever, night sweats)     PLAN:    -Viral  hepatitis panel pending; EBV serology indicative of past infection. Send for HIV   -Iron studies noted, Ferritin is elevated to 446 pointing towards ACD  -Retic and Hapto WNL; LDH elevated 365; Kemi negative   -SPEP and Immunoglobulin levels pending   -Flow cytometry results are pending   -MRCP done: no choledocholithiasis  -CT Chest shows small b/l pleural effusions, b/l axillary nonspecific lymphadenopathy and right middle lobe subcentimeter nodules.   -DIC panel unremarkable   -Reviewed records from pt's PCP about previous CBCs : pt did have mild anemia but normal plt counts    Awaiting results of flow cytometry  to determine further work up. If flow cytometry shows any suspicion for underlying hematological condition, will consider bone marrow biopsy. Pt does not have to stay inpatient for the flow cytometry results if medically cleared for discharge. We can follow up results outpatient, and pt will have a follow up with Dr Castanon to discuss results of flow cytometry, repeat CBC and potential bone marrow biopsy. Pt would like to follow up with PMD for CBC and then would like to see her own hematologist if flow cytometry is wnl.

## 2021-09-29 LAB
% ALBUMIN: 50.2 % — SIGNIFICANT CHANGE UP
% ALPHA 1: 6.5 % — SIGNIFICANT CHANGE UP
% ALPHA 2: 9.9 % — SIGNIFICANT CHANGE UP
% BETA: 12.6 % — SIGNIFICANT CHANGE UP
% GAMMA: 20.8 % — SIGNIFICANT CHANGE UP
% M SPIKE: SIGNIFICANT CHANGE UP
ALBUMIN SERPL ELPH-MCNC: 3.1 G/DL — LOW (ref 3.6–5.5)
ALBUMIN/GLOB SERPL ELPH: 1 RATIO — SIGNIFICANT CHANGE UP
ALPHA1 GLOB SERPL ELPH-MCNC: 0.4 G/DL — SIGNIFICANT CHANGE UP (ref 0.1–0.4)
ALPHA2 GLOB SERPL ELPH-MCNC: 0.6 G/DL — SIGNIFICANT CHANGE UP (ref 0.5–1)
ANA PAT FLD IF-IMP: ABNORMAL
ANA TITR SER: ABNORMAL
B-GLOBULIN SERPL ELPH-MCNC: 0.8 G/DL — SIGNIFICANT CHANGE UP (ref 0.5–1)
CULTURE RESULTS: SIGNIFICANT CHANGE UP
GAMMA GLOBULIN: 1.3 G/DL — SIGNIFICANT CHANGE UP (ref 0.6–1.6)
HEV AB FLD QL: NEGATIVE — SIGNIFICANT CHANGE UP
M-SPIKE: SIGNIFICANT CHANGE UP (ref 0–0)
PROT PATTERN SERPL ELPH-IMP: SIGNIFICANT CHANGE UP
SPECIMEN SOURCE: SIGNIFICANT CHANGE UP
TM INTERPRETATION: SIGNIFICANT CHANGE UP

## 2021-09-30 PROBLEM — Z00.00 ENCOUNTER FOR PREVENTIVE HEALTH EXAMINATION: Status: ACTIVE | Noted: 2021-09-30

## 2021-09-30 LAB
HSV1 AB FLD QL: NEGATIVE — SIGNIFICANT CHANGE UP
HSV2 AB FLD-ACNC: NEGATIVE — SIGNIFICANT CHANGE UP
SOLUBLE LIVER IGG SER IA-ACNC: 0.9 — SIGNIFICANT CHANGE UP (ref 0–20)
VZV DNA, PCR RESULT: NEGATIVE — SIGNIFICANT CHANGE UP

## 2021-10-01 ENCOUNTER — APPOINTMENT (OUTPATIENT)
Dept: HEMATOLOGY ONCOLOGY | Facility: CLINIC | Age: 51
End: 2021-10-01
Payer: COMMERCIAL

## 2021-10-01 ENCOUNTER — LABORATORY RESULT (OUTPATIENT)
Age: 51
End: 2021-10-01

## 2021-10-01 ENCOUNTER — OUTPATIENT (OUTPATIENT)
Dept: OUTPATIENT SERVICES | Facility: HOSPITAL | Age: 51
LOS: 1 days | Discharge: HOME | End: 2021-10-01
Payer: COMMERCIAL

## 2021-10-01 ENCOUNTER — RESULT REVIEW (OUTPATIENT)
Age: 51
End: 2021-10-01

## 2021-10-01 VITALS
HEART RATE: 97 BPM | BODY MASS INDEX: 21.19 KG/M2 | RESPIRATION RATE: 14 BRPM | HEIGHT: 67 IN | TEMPERATURE: 97.2 F | SYSTOLIC BLOOD PRESSURE: 129 MMHG | WEIGHT: 135 LBS | DIASTOLIC BLOOD PRESSURE: 69 MMHG | OXYGEN SATURATION: 98 %

## 2021-10-01 DIAGNOSIS — R16.2 HEPATOMEGALY WITH SPLENOMEGALY, NOT ELSEWHERE CLASSIFIED: ICD-10-CM

## 2021-10-01 DIAGNOSIS — D72.820 LYMPHOCYTOSIS (SYMPTOMATIC): ICD-10-CM

## 2021-10-01 LAB
HCT VFR BLD CALC: 31 %
HGB BLD-MCNC: 10.4 G/DL
MCHC RBC-ENTMCNC: 29.8 PG
MCHC RBC-ENTMCNC: 33.5 G/DL
MCV RBC AUTO: 88.8 FL
PLATELET # BLD AUTO: 227 K/UL
PMV BLD: 9.4 FL
RBC # BLD: 3.49 M/UL
RBC # FLD: 15.2 %
WBC # FLD AUTO: 10.6 K/UL

## 2021-10-01 PROCEDURE — 88341 IMHCHEM/IMCYTCHM EA ADD ANTB: CPT | Mod: 26,59

## 2021-10-01 PROCEDURE — 99214 OFFICE O/P EST MOD 30 MIN: CPT

## 2021-10-01 PROCEDURE — 88342 IMHCHEM/IMCYTCHM 1ST ANTB: CPT | Mod: 26,59

## 2021-10-01 PROCEDURE — 38220 DX BONE MARROW ASPIRATIONS: CPT | Mod: 59

## 2021-10-01 PROCEDURE — 88305 TISSUE EXAM BY PATHOLOGIST: CPT | Mod: 26

## 2021-10-01 PROCEDURE — 88311 DECALCIFY TISSUE: CPT | Mod: 26

## 2021-10-01 PROCEDURE — 38221 DX BONE MARROW BIOPSIES: CPT

## 2021-10-01 PROCEDURE — 88189 FLOWCYTOMETRY/READ 16 & >: CPT

## 2021-10-01 PROCEDURE — 88313 SPECIAL STAINS GROUP 2: CPT | Mod: 26

## 2021-10-01 PROCEDURE — 88365 INSITU HYBRIDIZATION (FISH): CPT | Mod: 26

## 2021-10-01 NOTE — CDI QUERY NOTE - NSCDIOTHERTXTBX_GEN_ALL_CORE_HH
Clinical Indicators    9/23/2021: HPI Internal medicine: 51 yo F with PMHx anxiety who presents to ED for intermittent abd pain x 1ms. Found with Hepatosplenomegaly.    Laboratory Findings  9/24/2021 Sodium 132    Glucose 98  9/ 25/2021 Sodium 134    Glucose 117  IVF:  9/23-9/27 NS @ 75ml/hr    Based on your professional judgment and above clinical findings please clarify  Sodium results as    - Hyponatremia  -Other please specify  - Not clinically significant

## 2021-10-04 ENCOUNTER — LABORATORY RESULT (OUTPATIENT)
Age: 51
End: 2021-10-04

## 2021-10-04 LAB
ALBUMIN SERPL ELPH-MCNC: 4.1 G/DL
ALP BLD-CCNC: 179 U/L
ALT SERPL-CCNC: 25 U/L
ANION GAP SERPL CALC-SCNC: 14 MMOL/L
AST SERPL-CCNC: 29 U/L
BILIRUB SERPL-MCNC: 0.4 MG/DL
BUN SERPL-MCNC: 9 MG/DL
CALCIUM SERPL-MCNC: 8.9 MG/DL
CHLORIDE SERPL-SCNC: 101 MMOL/L
CO2 SERPL-SCNC: 22 MMOL/L
CREAT SERPL-MCNC: 0.8 MG/DL
GLUCOSE SERPL-MCNC: 109 MG/DL
IGG SER QL IEP: 1447 MG/DL
LDH SERPL-CCNC: 288
POTASSIUM SERPL-SCNC: 4.4 MMOL/L
PROT SERPL-MCNC: 7.5 G/DL
SODIUM SERPL-SCNC: 137 MMOL/L

## 2021-10-04 NOTE — PROCEDURE
[Bone Marrow Biopsy] : bone marrow biopsy [Bone Marrow Aspiration] : bone marrow aspiration  [Patient] : the patient [Laterality verified and correct site marked] : laterality verified and correct site marked [Right] : site: right [Left lateral decibitus position] : left lateral decibitus position [The right posterior iliac crest was prepped with betadine and draped, using sterile technique.] : The right posterior iliac crest was prepped with betadine and draped, using sterile technique. [Aspirate] : aspirate [Cytogenetics] : cytogenetics [FISH] : FISH [Biopsy] : biopsy [Flow Cytometry] : flow cytometry [] : The patient was instructed to remove the bandage the following AM. The patient may bathe. Acetaminophen may be taken for discomfort, as per package directions.If there are any other problems, the patient was instructed to call the office. The patient verbalized understanding, and is aware of the office contact numbers. [FreeTextEntry1] : lymphocytosis , splenomegaly

## 2021-10-05 ENCOUNTER — LABORATORY RESULT (OUTPATIENT)
Age: 51
End: 2021-10-05

## 2021-10-05 DIAGNOSIS — R10.11 RIGHT UPPER QUADRANT PAIN: ICD-10-CM

## 2021-10-05 DIAGNOSIS — F41.9 ANXIETY DISORDER, UNSPECIFIED: ICD-10-CM

## 2021-10-05 DIAGNOSIS — D63.8 ANEMIA IN OTHER CHRONIC DISEASES CLASSIFIED ELSEWHERE: ICD-10-CM

## 2021-10-05 DIAGNOSIS — Z20.5 CONTACT WITH AND (SUSPECTED) EXPOSURE TO VIRAL HEPATITIS: ICD-10-CM

## 2021-10-05 DIAGNOSIS — F10.10 ALCOHOL ABUSE, UNCOMPLICATED: ICD-10-CM

## 2021-10-05 DIAGNOSIS — D69.6 THROMBOCYTOPENIA, UNSPECIFIED: ICD-10-CM

## 2021-10-05 DIAGNOSIS — J90 PLEURAL EFFUSION, NOT ELSEWHERE CLASSIFIED: ICD-10-CM

## 2021-10-05 DIAGNOSIS — R16.2 HEPATOMEGALY WITH SPLENOMEGALY, NOT ELSEWHERE CLASSIFIED: ICD-10-CM

## 2021-10-05 DIAGNOSIS — E87.1 HYPO-OSMOLALITY AND HYPONATREMIA: ICD-10-CM

## 2021-10-05 DIAGNOSIS — R18.8 OTHER ASCITES: ICD-10-CM

## 2021-10-05 LAB — HEV IGM SER QL: ABNORMAL

## 2021-10-06 NOTE — HISTORY OF PRESENT ILLNESS
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 CT scan showed hepatosplenomegaly . thickened , edematous gall bladder with negative HIDA scan . Her symptoms improved gradually and she remained afebrile , platelets and LFTs recovered as well , MRCP was negative , viral studies were negative for acute or recent infections . Smear showed Lymphocytosis, small cells predominance, some with intermediate-sized, Flow cytometry revealed The lymphocyte immunophenotypic findings are suggestive of T cell lymphoproliferative disorder. Given the aberrant T cell population with decreased normal lymphocyte populations, suggest molecular study for TCR gene rearrangement to evaluate for T-cell clonality.\par CT chest showed no mediastinal adenopathy , non-specific bilateral axillary lymph nodes. \par Since discharge she feels better , no abdominal pain or fever , still with mild night sweats . \par \par \par

## 2021-10-06 NOTE — HISTORY OF PRESENT ILLNESS
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 CT scan showed hepatosplenomegaly . thickened , edematous gall bladder with negative HIDA scan . Her symptoms improved gradually and she remained afebrile , platelets and LFTs recovered as well , MRCP was negative , viral studies were negative for acute or recent infections . Smear showed Lymphocytosis, small cells predominance, some with intermediate-sized, Flow cytometry revealed The lymphocyte immunophenotypic findings are suggestive of T cell lymphoproliferative disorder. Given the aberrant T cell population with decreased normal lymphocyte populations, suggest molecular study for TCR gene rearrangement to evaluate for T-cell clonality.\par CT chest showed no mediastinal adenopathy , non-specific bilateral axillary lymph nodes. \par Since discharge she feels better , no abdominal pain or fever , still with mild night sweats . \par \par \par

## 2021-10-06 NOTE — REASON FOR VISIT
[Follow-Up Visit] : a follow-up visit for [Bone Marrow Biopsy] : bone marrow biopsy [Bone Marrow Aspiration] : bone marrow aspiration [Spouse] : spouse [FreeTextEntry2] : hepatosplenomegaly

## 2021-10-06 NOTE — ASSESSMENT
[FreeTextEntry1] : 1) hepatosplenomegaly , lymphocytosis , flow suggestive of T cell lymphoproliferative disorder. \par 2) S/P episode of abdominal pain , fever , transaminitis , low platelets - resolving ? rule out viral syndrome , rule out choledocholithiasis ? \par 3) Today's CBC shows persistent  normocytic anemia and  lymphocytosis .\par     Thrombocytopenia , now resolved . \par Plan : the differential was discussed at length with the patient and her spouse . I suspect  that the two problems may be unrelated and we are  dealing with a pre-existing lymphoproliferative disorder yet to be diagnosed , T cell CLL or T cell lymphoma/leukemia . \par She agreed to a bone marrow biopsy which was performed today without complication and sent for routine as well as molecular testing ( TCR gene re-arrangement ...)

## 2021-10-06 NOTE — PHYSICAL EXAM
[Normal] : normal appearance, no rash, nodules, vesicles, ulcers, erythema [de-identified] : spleen 3 fingers bcm on inspiration.

## 2021-10-06 NOTE — PHYSICAL EXAM
[Normal] : normal appearance, no rash, nodules, vesicles, ulcers, erythema [de-identified] : spleen 3 fingers bcm on inspiration.

## 2021-10-20 LAB — HEMATOPATHOLOGY REPORT: SIGNIFICANT CHANGE UP

## 2021-11-24 DIAGNOSIS — R16.2 HEPATOMEGALY WITH SPLENOMEGALY, NOT ELSEWHERE CLASSIFIED: ICD-10-CM

## 2021-11-24 DIAGNOSIS — D72.820 LYMPHOCYTOSIS (SYMPTOMATIC): ICD-10-CM

## 2022-01-05 ENCOUNTER — FORM ENCOUNTER (OUTPATIENT)
Age: 52
End: 2022-01-05

## 2022-01-06 ENCOUNTER — TRANSCRIPTION ENCOUNTER (OUTPATIENT)
Age: 52
End: 2022-01-06

## 2022-01-08 ENCOUNTER — OUTPATIENT (OUTPATIENT)
Dept: INPATIENT UNIT | Facility: HOSPITAL | Age: 52
LOS: 1 days | Discharge: HOME | End: 2022-01-08

## 2022-01-08 ENCOUNTER — APPOINTMENT (OUTPATIENT)
Age: 52
End: 2022-01-08

## 2022-01-08 VITALS
HEART RATE: 88 BPM | OXYGEN SATURATION: 98 % | DIASTOLIC BLOOD PRESSURE: 55 MMHG | SYSTOLIC BLOOD PRESSURE: 97 MMHG | TEMPERATURE: 98 F | RESPIRATION RATE: 16 BRPM

## 2022-01-08 VITALS
SYSTOLIC BLOOD PRESSURE: 103 MMHG | DIASTOLIC BLOOD PRESSURE: 63 MMHG | OXYGEN SATURATION: 99 % | RESPIRATION RATE: 17 BRPM | TEMPERATURE: 98 F | HEART RATE: 86 BPM

## 2022-01-08 DIAGNOSIS — U07.1 COVID-19: ICD-10-CM

## 2022-01-08 RX ORDER — SODIUM CHLORIDE 9 MG/ML
250 INJECTION INTRAMUSCULAR; INTRAVENOUS; SUBCUTANEOUS
Refills: 0 | Status: DISCONTINUED | OUTPATIENT
Start: 2022-01-08 | End: 2022-01-08

## 2022-01-08 RX ORDER — SOTROVIMAB 62.5 MG/ML
500 INJECTION, SOLUTION, CONCENTRATE INTRAVENOUS ONCE
Refills: 0 | Status: COMPLETED | OUTPATIENT
Start: 2022-01-08 | End: 2022-01-08

## 2022-01-08 RX ADMIN — SODIUM CHLORIDE 25 MILLILITER(S): 9 INJECTION INTRAMUSCULAR; INTRAVENOUS; SUBCUTANEOUS at 11:25

## 2022-01-08 RX ADMIN — SOTROVIMAB 116 MILLIGRAM(S): 62.5 INJECTION, SOLUTION, CONCENTRATE INTRAVENOUS at 10:15

## 2024-10-03 NOTE — H&P ADULT - NSHPPHYSICALEXAM_GEN_ALL_CORE
PHYSICAL EXAM:  GENERAL: NAD, lying in bed comfortably  HEAD:  Atraumatic, Normocephalic  EYES: EOMI, PERRLA, conjunctiva and sclera clear  ENT: Moist mucous membranes  NECK: Supple, No JVD  CHEST/LUNG: Clear to auscultation bilaterally; No rales, rhonchi, wheezing, or rubs. Unlabored respirations  HEART: Regular rate and rhythm; No murmurs, rubs, or gallops  ABDOMEN: Bowel sounds present; Soft, Nontender, Nondistended. No hepatomegally  EXTREMITIES:  2+ Peripheral Pulses, brisk capillary refill. No clubbing, cyanosis, or edema  NERVOUS SYSTEM:  Alert & Oriented X3, speech clear. No deficits   MSK: FROM all 4 extremities, full and equal strength  SKIN: No rashes or lesions 29.2 PHYSICAL EXAM:  GENERAL: NAD, lying in bed comfortably but tearful   HEAD:  Atraumatic, Normocephalic  EYES: EOMI, PERRLA, conjunctiva and sclera clear  ENT: Moist mucous membranes  NECK: Supple, No JVD  CHEST/LUNG: Clear to auscultation bilaterally; No rales, rhonchi, wheezing, or rubs. Unlabored respirations  HEART: Regular rate and rhythm; No murmurs, rubs, or gallops  ABDOMEN: Bowel sounds present; Soft, tender to mild palpation in epigastric and LLQ, nondistended. Marked hepatomegaly, enlarged spleen. no rebound tenderness  EXTREMITIES:  2+ Peripheral Pulses, brisk capillary refill. No clubbing, cyanosis, or edema  NERVOUS SYSTEM:  Alert & Oriented X3, speech clear. No deficits   MSK: FROM all 4 extremities, full and equal strength  SKIN: No rashes or lesions

## 2025-03-24 NOTE — ED ADULT TRIAGE NOTE - DATE OF LAST VACCINATION
No care due was identified.  [facility] Embedded Care Due Messages. Reference number: [id number].   3/23/2025 7:02:02 AM CDT  
Refill Routing Note   Medication(s) are not appropriate for processing by [facility] for the following reason(s):        No active prescription written by provider    [facility] action(s):  Defer               Appointments  past 12m or future 3m with PCP    Date Provider   Last Visit   6/28/2024 Willi Estevez, DO   Next Visit   7/1/2025 Willi Estevez, DO   ED visits in past 90 days: 0        Note composed:8:21 PM 03/23/2025          
17-Sep-2021